# Patient Record
Sex: FEMALE | Race: WHITE | NOT HISPANIC OR LATINO | Employment: FULL TIME | URBAN - METROPOLITAN AREA
[De-identification: names, ages, dates, MRNs, and addresses within clinical notes are randomized per-mention and may not be internally consistent; named-entity substitution may affect disease eponyms.]

---

## 2017-01-09 ENCOUNTER — GENERIC CONVERSION - ENCOUNTER (OUTPATIENT)
Dept: OTHER | Facility: OTHER | Age: 42
End: 2017-01-09

## 2017-01-09 DIAGNOSIS — Z12.31 ENCOUNTER FOR SCREENING MAMMOGRAM FOR MALIGNANT NEOPLASM OF BREAST: ICD-10-CM

## 2017-05-23 ENCOUNTER — GENERIC CONVERSION - ENCOUNTER (OUTPATIENT)
Dept: OTHER | Facility: OTHER | Age: 42
End: 2017-05-23

## 2017-06-12 ENCOUNTER — ALLSCRIPTS OFFICE VISIT (OUTPATIENT)
Dept: OTHER | Facility: OTHER | Age: 42
End: 2017-06-12

## 2018-01-13 VITALS
DIASTOLIC BLOOD PRESSURE: 70 MMHG | BODY MASS INDEX: 37 KG/M2 | SYSTOLIC BLOOD PRESSURE: 120 MMHG | HEIGHT: 61 IN | WEIGHT: 196 LBS

## 2018-01-15 NOTE — MISCELLANEOUS
Message   Recorded as Task   Date: 01/09/2017 02:16 PM, Created By: Jose Heredia   Task Name: Miscellaneous   Assigned To: Mat Monroy   Regarding Patient: Kelsey Edmonds, Status: Active   CommentDeirshravan Whitneyas - 09 Jan 2017 2:16 PM     TASK CREATED  Caller: Self; Other; [phone] (Home)  Pt has mammo sched for 5/23 and needs order  Sakshi Chatterjee - 09 Jan 2017 2:25 PM     TASK EDITED  order in allscripts        Active Problems    1  Diabetes Mellitus (250 00)   2  Encounter for gynecological examination without abnormal finding (V72 31) (Z01 419)   3  Encounter for screening mammogram for malignant neoplasm of breast (V76 12)   (Z12 31)   4  Screening for HPV (human papillomavirus) (V73 81) (Z11 51)    Current Meds   1  Lantus 100 UNIT/ML Subcutaneous Solution; Therapy: (Blair Garcia) to Recorded   2  MetFORMIN HCl - 1000 MG Oral Tablet; Therapy: (Recorded:02May2016) to Recorded    Allergies    1  No Known Drug Allergies    Plan  Encounter for screening mammogram for malignant neoplasm of breast    · * MAMMO SCREENING BILATERAL W CAD; Status:Hold For - Scheduling,Retrospective  Authorization;  Requested for:23May2017;     Signatures   Electronically signed by : Gil Duron, ; Jan 9 2017  2:25PM EST                       (Author)

## 2018-05-17 DIAGNOSIS — Z12.31 ENCOUNTER FOR SCREENING MAMMOGRAM FOR MALIGNANT NEOPLASM OF BREAST: ICD-10-CM

## 2018-07-02 ENCOUNTER — ANNUAL EXAM (OUTPATIENT)
Dept: OBGYN CLINIC | Facility: MEDICAL CENTER | Age: 43
End: 2018-07-02
Payer: COMMERCIAL

## 2018-07-02 VITALS
HEIGHT: 61 IN | SYSTOLIC BLOOD PRESSURE: 116 MMHG | WEIGHT: 192 LBS | DIASTOLIC BLOOD PRESSURE: 78 MMHG | BODY MASS INDEX: 36.25 KG/M2

## 2018-07-02 DIAGNOSIS — Z12.31 VISIT FOR SCREENING MAMMOGRAM: Primary | ICD-10-CM

## 2018-07-02 PROCEDURE — 99396 PREV VISIT EST AGE 40-64: CPT | Performed by: PHYSICIAN ASSISTANT

## 2018-07-02 NOTE — PROGRESS NOTES
Marylee Callow  1975      CC:  Yearly exam    S:  36 y o  female here for yearly exam  Her cycles are regular, not heavy or crampy  She is sexually active  She uses tubal ligation for contraception  Last Pap 2016 neg/neg  Last Mammo 2018 neg      Current Outpatient Prescriptions:     BD ULTRA-FINE LANCETS lancets, USE TO INJECT INSULIN ONCE DAILY AS DIRECTED (FURTHER REFILLS TO BE GIVEN AT UPCOMING APPOINTMENT), Disp: , Rfl:     Insulin Glargine-Lixisenatide (SOLIQUA) 100 units-33 mcg/mL injection pen, Inject 50 Units under the skin, Disp: , Rfl:     metFORMIN (GLUCOPHAGE) 1000 MG tablet, TAKE 1 TABLET TWICE A DAY (1 TABLET IN THE MORNING AND 1 TABLET AT SUPPER), Disp: , Rfl:     Misc Natural Products (BLOOD SUGAR 360 PO), One touch verio strips Testing BID, Disp: , Rfl:   Social History     Social History    Marital status: /Civil Union     Spouse name: N/A    Number of children: N/A    Years of education: N/A     Occupational History    Not on file  Social History Main Topics    Smoking status: Never Smoker    Smokeless tobacco: Never Used    Alcohol use Yes      Comment: Social    Drug use: Unknown    Sexual activity: Yes     Other Topics Concern    Not on file     Social History Narrative    Coffee    Exercise frequently         Family History   Problem Relation Age of Onset    Arthritis Mother     Diabetes Maternal Grandmother     Heart disease Family     Hypertension Family       Past Medical History:   Diagnosis Date    Breast lump     last assessed: 09/25/13    Diabetes mellitus type II, controlled ([facility] [address] [facility]     Eustachian tube dysfunction     last assessed: 12/14/15    Vagina, candidiasis     last assessed: 05/23/13        O:  Blood pressure 116/78, height 5' 0 63" (1 54 m), weight 87 1 kg (192 lb)      Patient appears well and is not in distress  Neck is supple without masses  Breasts are symmetrical without mass, tenderness, nipple discharge, skin changes or adenopathy  Abdomen is soft and nontender without masses  External genitals are normal without lesions or rashes  Vagina is normal without discharge or bleeding  Cervix is normal without discharge or lesion  Uterus is normal, mobile, nontender without palpable mass  Adnexa are normal, nontender, without palpable mass  A:  Yearly exam      P:   Pap due 2021   Mammo slip provided    RTO one year for yearly exam or sooner as needed

## 2019-06-08 DIAGNOSIS — Z12.31 ENCOUNTER FOR SCREENING MAMMOGRAM FOR MALIGNANT NEOPLASM OF BREAST: ICD-10-CM

## 2019-07-22 ENCOUNTER — ANNUAL EXAM (OUTPATIENT)
Dept: OBGYN CLINIC | Facility: MEDICAL CENTER | Age: 44
End: 2019-07-22
Payer: COMMERCIAL

## 2019-07-22 VITALS — WEIGHT: 201.6 LBS | SYSTOLIC BLOOD PRESSURE: 116 MMHG | DIASTOLIC BLOOD PRESSURE: 62 MMHG | BODY MASS INDEX: 38.56 KG/M2

## 2019-07-22 DIAGNOSIS — Z12.31 ENCOUNTER FOR SCREENING MAMMOGRAM FOR MALIGNANT NEOPLASM OF BREAST: ICD-10-CM

## 2019-07-22 DIAGNOSIS — Z01.419 ENCNTR FOR GYN EXAM (GENERAL) (ROUTINE) W/O ABN FINDINGS: Primary | ICD-10-CM

## 2019-07-22 PROCEDURE — 99396 PREV VISIT EST AGE 40-64: CPT | Performed by: PHYSICIAN ASSISTANT

## 2019-07-22 NOTE — PROGRESS NOTES
Leodan Hearing  1975      CC:  Yearly exam    S:  39 y o  female here for yearly exam  Her cycles are regular, not heavy or crampy  She is sexually active without pain, bleeding or dryness  She uses tubal ligation for contraception  We reviewed [facility] guidelines for Pap testing today       Last Pap 5/2/16 neg/neg  Last Mammo 6/4/19 neg  Last Colonoscopy never    Current Outpatient Medications:     BD ULTRA-FINE LANCETS lancets, USE TO INJECT INSULIN ONCE DAILY AS DIRECTED (FURTHER REFILLS TO BE GIVEN AT UPCOMING APPOINTMENT), Disp: , Rfl:     Insulin Glargine-Lixisenatide (SOLIQUA) 100 units-33 mcg/mL injection pen, Inject 50 Units under the skin, Disp: , Rfl:     metFORMIN (GLUCOPHAGE) 1000 MG tablet, TAKE 1 TABLET TWICE A DAY (1 TABLET IN THE MORNING AND 1 TABLET AT SUPPER), Disp: , Rfl:     Misc Natural Products (BLOOD SUGAR 360 PO), One touch verio strips Testing BID, Disp: , Rfl:   Social History     Socioeconomic History    Marital status: /Civil Union     Spouse name: Not on file    Number of children: Not on file    Years of education: Not on file    Highest education level: Not on file   Occupational History    Not on file   Social Needs    Financial resource strain: Not on file    Food insecurity:     Worry: Not on file     Inability: Not on file    Transportation needs:     Medical: Not on file     Non-medical: Not on file   Tobacco Use    Smoking status: Never Smoker    Smokeless tobacco: Never Used   Substance and Sexual Activity    Alcohol use: Yes     Comment: Social    Drug use: Not on file    Sexual activity: Yes   Lifestyle    Physical activity:     Days per week: Not on file     Minutes per session: Not on file    Stress: Not on file   Relationships    Social connections:     Talks on phone: Not on file     Gets together: Not on file     Attends Zoroastrian service: Not on file     Active member of club or organization: Not on file     Attends meetings of clubs or organizations: Not on file     Relationship status: Not on file    Intimate partner violence:     Fear of current or ex partner: Not on file     Emotionally abused: Not on file     Physically abused: Not on file     Forced sexual activity: Not on file   Other Topics Concern    Not on file   Social History Narrative    Coffee    Exercise frequently     Family History   Problem Relation Age of Onset    Arthritis Mother     Diabetes Maternal Grandmother     Heart disease Family     Hypertension Family       Past Medical History:   Diagnosis Date    Breast lump     last assessed: 09/25/13    Diabetes mellitus type II, controlled ([facility] [address] [facility]     Eustachian tube dysfunction     last assessed: 12/14/15    Vagina, candidiasis     last assessed: 05/23/13        Review of Systems   Respiratory: Negative  Cardiovascular: Negative  Gastrointestinal: Negative for constipation and diarrhea  Genitourinary: Negative for difficulty urinating, pelvic pain, vaginal bleeding, vaginal discharge, itching or odor  O:  Blood pressure 116/62, weight 91 4 kg (201 lb 9 6 oz), last menstrual period 07/15/2019, not currently breastfeeding  Patient appears well and is not in distress  Neck is supple without masses  Breasts are symmetrical without mass, tenderness, nipple discharge, skin changes or adenopathy  Abdomen is soft and nontender without masses  External genitals are normal without lesions or rashes  Urethral meatus and urethra are normal  Bladder is normal to palpation  Vagina is normal without discharge or bleeding  Cervix is normal without discharge or lesion  Uterus is normal, mobile, nontender without palpable mass  Adnexa are normal, nontender, without palpable mass  A:  Yearly exam      P:   Pap due 6/2021   Mammo slip provided    RTO one year for yearly exam or sooner as needed

## 2020-06-11 DIAGNOSIS — Z12.31 ENCOUNTER FOR SCREENING MAMMOGRAM FOR MALIGNANT NEOPLASM OF BREAST: ICD-10-CM

## 2020-08-18 ENCOUNTER — ANNUAL EXAM (OUTPATIENT)
Dept: OBGYN CLINIC | Facility: CLINIC | Age: 45
End: 2020-08-18
Payer: COMMERCIAL

## 2020-08-18 VITALS
HEIGHT: 61 IN | SYSTOLIC BLOOD PRESSURE: 128 MMHG | TEMPERATURE: 98.8 F | BODY MASS INDEX: 35.5 KG/M2 | DIASTOLIC BLOOD PRESSURE: 80 MMHG | WEIGHT: 188 LBS

## 2020-08-18 DIAGNOSIS — Z01.419 ENCNTR FOR GYN EXAM (GENERAL) (ROUTINE) W/O ABN FINDINGS: Primary | ICD-10-CM

## 2020-08-18 DIAGNOSIS — Z12.31 ENCOUNTER FOR SCREENING MAMMOGRAM FOR MALIGNANT NEOPLASM OF BREAST: ICD-10-CM

## 2020-08-18 DIAGNOSIS — Z12.11 COLON CANCER SCREENING: ICD-10-CM

## 2020-08-18 PROCEDURE — 99396 PREV VISIT EST AGE 40-64: CPT | Performed by: PHYSICIAN ASSISTANT

## 2020-08-18 NOTE — PROGRESS NOTES
Brandin Estrella  1975      CC:  Yearly exam    S:  38 y o  female here for yearly exam  Her cycles are regular, not heavy or crampy  Sexual activity: She is sexually active without pain, bleeding or dryness  Contraception: She uses tubal ligation for contraception  Last Pap 5/2/16 neg/neg  Last Mammo 6/9/20 neg  Last Colonoscopy never    We reviewed [facility] guidelines for Pap testing today       Family hx of breast cancer: no  Family hx of ovarian cancer: no  Family hx of colon cancer: no      Current Outpatient Medications:     BD ULTRA-FINE LANCETS lancets, USE TO INJECT INSULIN ONCE DAILY AS DIRECTED (FURTHER REFILLS TO BE GIVEN AT UPCOMING APPOINTMENT), Disp: , Rfl:     Insulin Glargine-Lixisenatide (SOLIQUA) 100 units-33 mcg/mL injection pen, Inject 50 Units under the skin, Disp: , Rfl:     metFORMIN (GLUCOPHAGE) 1000 MG tablet, TAKE 1 TABLET TWICE A DAY (1 TABLET IN THE MORNING AND 1 TABLET AT SUPPER), Disp: , Rfl:     Misc Natural Products (BLOOD SUGAR 360 PO), One touch verio strips Testing BID, Disp: , Rfl:   Social History     Socioeconomic History    Marital status: /Civil Union     Spouse name: Not on file    Number of children: Not on file    Years of education: Not on file    Highest education level: Not on file   Occupational History    Not on file   Social Needs    Financial resource strain: Not on file    Food insecurity     Worry: Not on file     Inability: Not on file   [employer] needs     Medical: Not on file     Non-medical: Not on file   Tobacco Use    Smoking status: Never Smoker    Smokeless tobacco: Never Used   Substance and Sexual Activity    Alcohol use: Yes     Frequency: Never     Drinks per session: 1 or 2     Binge frequency: Never     Comment: Social    Drug use: Never    Sexual activity: Yes     Partners: Male     Birth control/protection: Female Sterilization   Lifestyle    Physical activity     Days per week: Not on file     Minutes per session: Not on file    Stress: Not on file   Relationships    Social connections     Talks on phone: Not on file     Gets together: Not on file     Attends Worship service: Not on file     Active member of club or organization: Not on file     Attends meetings of clubs or organizations: Not on file     Relationship status: Not on file    Intimate partner violence     Fear of current or ex partner: Not on file     Emotionally abused: Not on file     Physically abused: Not on file     Forced sexual activity: Not on file   Other Topics Concern    Not on file   Social History Narrative    Coffee    Exercise frequently     Family History   Problem Relation Age of Onset    Arthritis Mother     Diabetes Maternal Grandmother     Heart disease Family     Hypertension Family       Past Medical History:   Diagnosis Date    Breast lump     last assessed: 09/25/13    [facility] mellitus ([facility] [address] [facility]     Diabetes mellitus type II, controlled ([facility] [address] [facility]     Eustachian tube dysfunction     last assessed: 12/14/15    Hx of tubal ligation     Vagina, candidiasis     last assessed: 05/23/13        Review of Systems   Respiratory: Negative  Cardiovascular: Negative  Gastrointestinal: Negative for constipation and diarrhea  Genitourinary: Negative for difficulty urinating, pelvic pain, vaginal bleeding, vaginal discharge, itching or odor  O:  Blood pressure 128/80, temperature 98 8 °F (37 1 °C), temperature source Tympanic, height 5' 1 02" (1 55 m), weight 85 3 kg (188 lb), last menstrual period 07/25/2020, not currently breastfeeding  Patient appears well and is not in distress  Neck is supple without masses  Breasts are symmetrical without mass, tenderness, nipple discharge, skin changes or adenopathy  Abdomen is soft and nontender without masses  External genitals are normal without lesions or rashes    Urethral meatus and urethra are normal  Bladder is normal to palpation  Vagina is normal without discharge or bleeding  Cervix is normal without discharge or lesion  Uterus is normal, mobile, nontender without palpable mass  Adnexa are normal, nontender, without palpable mass  A:  Yearly exam      P:   Pap and HPV today    Mammo slip provided   Colonoscopy referral entered     RTO one year for yearly exam or sooner as needed

## 2020-08-20 LAB
CYTOLOGIST CVX/VAG CYTO: NORMAL
DX ICD CODE: NORMAL
HPV I/H RISK 1 DNA CVX QL PROBE+SIG AMP: NEGATIVE
OTHER STN SPEC: NORMAL
PATH REPORT.FINAL DX SPEC: NORMAL
SL AMB NOTE:: NORMAL
SL AMB SPECIMEN ADEQUACY: NORMAL
SL AMB TEST METHODOLOGY: NORMAL

## 2020-09-14 ENCOUNTER — APPOINTMENT (OUTPATIENT)
Dept: RADIOLOGY | Facility: MEDICAL CENTER | Age: 45
End: 2020-09-14
Payer: COMMERCIAL

## 2020-09-14 ENCOUNTER — OFFICE VISIT (OUTPATIENT)
Dept: URGENT CARE | Facility: MEDICAL CENTER | Age: 45
End: 2020-09-14
Payer: COMMERCIAL

## 2020-09-14 VITALS
RESPIRATION RATE: 18 BRPM | DIASTOLIC BLOOD PRESSURE: 61 MMHG | SYSTOLIC BLOOD PRESSURE: 131 MMHG | BODY MASS INDEX: 34.36 KG/M2 | WEIGHT: 182 LBS | HEIGHT: 61 IN | HEART RATE: 80 BPM | TEMPERATURE: 98.9 F | OXYGEN SATURATION: 100 %

## 2020-09-14 DIAGNOSIS — M79.644 THUMB PAIN, RIGHT: ICD-10-CM

## 2020-09-14 DIAGNOSIS — M79.644 THUMB PAIN, RIGHT: Primary | ICD-10-CM

## 2020-09-14 PROCEDURE — 99213 OFFICE O/P EST LOW 20 MIN: CPT | Performed by: PHYSICIAN ASSISTANT

## 2020-09-14 PROCEDURE — 73140 X-RAY EXAM OF FINGER(S): CPT

## 2020-09-14 NOTE — PATIENT INSTRUCTIONS
Thumb pain  Referred to orthopedics  Follow up with PCP in 3-5 days  Proceed to  ER if symptoms worsen  Finger Sprain   WHAT YOU NEED TO KNOW:   A finger sprain happens when ligaments in your finger or thumb are stretched or torn  Ligaments are the tough tissues that connect bones  Ligaments allow your hands to grasp and pinch  DISCHARGE INSTRUCTIONS:   Return to the emergency department if:   · The skin on your injured finger looks bluish or pale (less color than normal)  · You have new weakness or numbness in your finger or thumb  It may tingle or burn  · You have a splint that you cannot adjust and it feels too tight  Contact your healthcare provider if:   · You have new or increased swelling or pain in your finger  · You have new or increased stiffness when you move your injured finger  · You have questions or concerns about your injury or treatment  Medicines:   · Pain medicine  may be given  Do not wait until the pain is severe before taking your medicine  · Take your medicine as directed  Call your healthcare provider if you think your medicines are not helping or if you have side effects  Tell him if you take vitamins, herbs, or any other medicines  Keep a written list of your medicines  Include the amounts, and when and why you take them  Bring the list or the pill bottles to follow-up visits  Care for your finger:   · Rest  your finger for at least 48 hours  Do not do activities that cause pain  Return to normal activities as directed  · Apply ice  on your finger to help decrease pain and swelling  Put crushed ice in a plastic bag and cover it with a towel  Put the ice on your injured finger or thumb every hour for 15 to 20 minutes at a time  You may need to ice the area at least 4 to 8 times each day  Ice your finger for as many days as directed  · Elevate your finger  above the level of your heart as often as you can  This will help decrease swelling and pain   You can elevate your hand by resting it on a pillow  · Use a splint or compression as directed  Compression (tight hold) helps support your finger or thumb as it heals  Tape your injured finger to the finger beside it  Severe sprains may be treated with a splint  A splint prevents your finger from moving while it heals  Ask how long you must wear the splint or tape, and how to apply them  · Do exercises as directed  You may be given gentle exercises to begin in a few days  Exercises can help decrease stiffness in your finger or thumb  Exercises also help decrease pain and swelling and improve the movement of your finger or thumb  Check with your healthcare provider before you return to your normal activities or sports  Follow up with your healthcare provider as directed:  Write down any questions you may have to ask at your follow up visits  © 2017 [address] [employer] Information is for End User's use only and may not be sold, redistributed or otherwise used for commercial purposes  All illustrations and images included in CareNotes® are the copyrighted property of A [employer] M [employer]  or Brandon Montemayor  The above information is an  only  It is not intended as medical advice for individual conditions or treatments  Talk to your doctor, nurse or pharmacist before following any medical regimen to see if it is safe and effective for you

## 2020-09-14 NOTE — PROGRESS NOTES
[facility]        NAME: Jyothi Castellanos is a 38 y o  female  : 1975    MRN: [id number]  DATE: 2020  TIME: 6:30 PM    Assessment and Plan   Thumb pain, right [M79 644]  1  Thumb pain, right  XR thumb right first digit-min 2v         Patient Instructions     Thumb pain  Referred to orthopedics  Follow up with PCP in 3-5 days  Proceed to  ER if symptoms worsen  Chief Complaint     Chief Complaint   Patient presents with    Hand Pain     Right thumb pain x 1 week  History of Present Illness       37 y/o female presents c/o pain and feeling like thumb is clicking when she tries to bend it  Patient had trauma to thumb 1 year ago but these symptoms started only 1 week ago  Review of Systems   Review of Systems   Constitutional: Negative  HENT: Negative  Eyes: Negative  Respiratory: Negative  Negative for cough, chest tightness, shortness of breath, wheezing and stridor  Cardiovascular: Negative  Negative for chest pain, palpitations and leg swelling  Musculoskeletal: Positive for arthralgias           Current Medications       Current Outpatient Medications:     BD ULTRA-FINE LANCETS lancets, USE TO INJECT INSULIN ONCE DAILY AS DIRECTED (FURTHER REFILLS TO BE GIVEN AT UPCOMING APPOINTMENT), Disp: , Rfl:     Insulin Glargine-Lixisenatide (SOLIQUA) 100 units-33 mcg/mL injection pen, Inject 50 Units under the skin, Disp: , Rfl:     metFORMIN (GLUCOPHAGE) 1000 MG tablet, TAKE 1 TABLET TWICE A DAY (1 TABLET IN THE MORNING AND 1 TABLET AT SUPPER), Disp: , Rfl:     Misc Natural Products (BLOOD SUGAR 360 PO), One touch verio strips Testing BID, Disp: , Rfl:     Current Allergies     Allergies as of 2020    (No Known Allergies)            The following portions of the patient's history were reviewed and updated as appropriate: allergies, current medications, past family history, past medical history, past social history, past surgical history and problem list      Past Medical History:   Diagnosis Date    Breast lump     last assessed: 13    Diabetes mellitus ([facility] [address]     Diabetes mellitus type II, controlled ([facility] )     Eustachian tube dysfunction     last assessed: 12/14/15    Hx of tubal ligation     Vagina, candidiasis     last assessed: 13       Past Surgical History:   Procedure Laterality Date     SECTION      x3       Family History   Problem Relation Age of Onset    Arthritis Mother     Diabetes Maternal Grandmother     Heart disease Family     Hypertension Family     No Known Problems Father          Medications have been verified  Objective   /61   Pulse 80   Temp 98 9 °F (37 2 °C) (Temporal)   Resp 18   Ht 5' 1" (1 549 m)   Wt 82 6 kg (182 lb)   LMP 2020 (Approximate)   SpO2 100%   BMI 34 39 kg/m²        Physical Exam     Physical Exam  Constitutional:       Appearance: She is well-developed  HENT:      Head: Normocephalic and atraumatic  Right Ear: External ear normal       Left Ear: External ear normal       Nose: Nose normal       Mouth/Throat:      Pharynx: No oropharyngeal exudate  Neck:      Musculoskeletal: Normal range of motion and neck supple  Cardiovascular:      Rate and Rhythm: Normal rate and regular rhythm  Heart sounds: Normal heart sounds  Pulmonary:      Effort: Pulmonary effort is normal  No respiratory distress  Breath sounds: Normal breath sounds  No wheezing or rales  Chest:      Chest wall: No tenderness  Musculoskeletal:        Hands:    Lymphadenopathy:      Cervical: No cervical adenopathy

## 2020-09-21 ENCOUNTER — OFFICE VISIT (OUTPATIENT)
Dept: OBGYN CLINIC | Facility: MEDICAL CENTER | Age: 45
End: 2020-09-21
Payer: COMMERCIAL

## 2020-09-21 VITALS
HEIGHT: 61 IN | HEART RATE: 76 BPM | SYSTOLIC BLOOD PRESSURE: 149 MMHG | DIASTOLIC BLOOD PRESSURE: 85 MMHG | WEIGHT: 185.8 LBS | BODY MASS INDEX: 35.08 KG/M2

## 2020-09-21 DIAGNOSIS — M65.311 TRIGGER THUMB OF RIGHT HAND: Primary | ICD-10-CM

## 2020-09-21 DIAGNOSIS — M79.644 THUMB PAIN, RIGHT: ICD-10-CM

## 2020-09-21 PROCEDURE — 99203 OFFICE O/P NEW LOW 30 MIN: CPT | Performed by: ORTHOPAEDIC SURGERY

## 2020-09-21 PROCEDURE — 20550 NJX 1 TENDON SHEATH/LIGAMENT: CPT | Performed by: ORTHOPAEDIC SURGERY

## 2020-09-21 RX ORDER — TRIAMCINOLONE ACETONIDE 40 MG/ML
20 INJECTION, SUSPENSION INTRA-ARTICULAR; INTRAMUSCULAR
Status: COMPLETED | OUTPATIENT
Start: 2020-09-21 | End: 2020-09-21

## 2020-09-21 RX ORDER — LIDOCAINE HYDROCHLORIDE 10 MG/ML
0.5 INJECTION, SOLUTION INFILTRATION; PERINEURAL
Status: COMPLETED | OUTPATIENT
Start: 2020-09-21 | End: 2020-09-21

## 2020-09-21 RX ADMIN — LIDOCAINE HYDROCHLORIDE 0.5 ML: 10 INJECTION, SOLUTION INFILTRATION; PERINEURAL at 13:12

## 2020-09-21 RX ADMIN — TRIAMCINOLONE ACETONIDE 20 MG: 40 INJECTION, SUSPENSION INTRA-ARTICULAR; INTRAMUSCULAR at 13:12

## 2020-09-21 NOTE — PROGRESS NOTES
CHIEF COMPLAINT:  Chief Complaint   Patient presents with    Right Thumb - Pain       SUBJECTIVE:  Rosa Goodman is a 38y o  year old  female who presents to the office for evaluation of her right thumb  Patient was seen in urgent care 2020 after 1 week of pain and catching with flexion of her right thumb        PAST MEDICAL HISTORY:  Past Medical History:   Diagnosis Date    Breast lump     last assessed: 13    Diabetes mellitus ([facility] [address] )     Diabetes mellitus type II, controlled ([facility] )     Eustachian tube dysfunction     last assessed: 12/14/15    Hx of tubal ligation     Vagina, candidiasis     last assessed: 13       PAST SURGICAL HISTORY:  Past Surgical History:   Procedure Laterality Date     SECTION      x3       FAMILY HISTORY:  Family History   Problem Relation Age of Onset    Arthritis Mother     Diabetes Maternal Grandmother     Heart disease Family     Hypertension Family     No Known Problems Father        SOCIAL HISTORY:  Social History     Tobacco Use    Smoking status: Never Smoker    Smokeless tobacco: Never Used   Substance Use Topics    Alcohol use: Yes     Frequency: Never     Drinks per session: 1 or 2     Binge frequency: Never     Comment: Social    Drug use: Never       MEDICATIONS:    Current Outpatient Medications:     BD ULTRA-FINE LANCETS lancets, USE TO INJECT INSULIN ONCE DAILY AS DIRECTED (FURTHER REFILLS TO BE GIVEN AT UPCOMING APPOINTMENT), Disp: , Rfl:     Insulin Glargine-Lixisenatide (SOLIQUA) 100 units-33 mcg/mL injection pen, Inject 50 Units under the skin, Disp: , Rfl:     metFORMIN (GLUCOPHAGE) 1000 MG tablet, TAKE 1 TABLET TWICE A DAY (1 TABLET IN THE MORNING AND 1 TABLET AT SUPPER), Disp: , Rfl:     Misc Natural Products (BLOOD SUGAR 360 PO), One touch verio strips Testing BID, Disp: , Rfl:     ALLERGIES:  No Known Allergies    REVIEW OF SYSTEMS:  Review of Systems   Constitutional: Negative for chills, fever and unexpected weight change  HENT: Negative for hearing loss, nosebleeds and sore throat  Eyes: Negative for pain, redness and visual disturbance  Respiratory: Negative for cough, shortness of breath and wheezing  Cardiovascular: Negative for chest pain, palpitations and leg swelling  Gastrointestinal: Negative for abdominal pain, nausea and vomiting  Endocrine: Negative for polydipsia and polyuria  Genitourinary: Negative for dysuria and hematuria  Skin: Negative for rash and wound  Neurological: Negative for dizziness and headaches  Psychiatric/Behavioral: Negative for decreased concentration, dysphoric mood and suicidal ideas  The patient is not nervous/anxious  VITALS:  Vitals:    09/21/20 1259   BP: 149/85   Pulse: 76       LABS:  HgA1c:   Lab Results   Component Value Date    HGBA1C 6 3 (H) 03/29/2014     BMP:   Lab Results   Component Value Date    GLUCOSE 78 03/29/2014    CALCIUM 9 4 03/29/2014     03/29/2014    K 3 9 03/29/2014    CO2 25 03/29/2014     03/29/2014    BUN 15 03/29/2014    CREATININE 0 55 (L) 03/29/2014       _____________________________________________________  PHYSICAL EXAMINATION:  General: well developed and well nourished, alert, oriented times 3 and appears comfortable  Psychiatric: Normal  HEENT: Trachea Midline, No torticollis  Pulmonary: No audible wheezing or strider  Cardiovascular: No discernable arrhythmia   Skin: No masses, erythema, lacerations, fluctation, ulcerations  Neurovascular: Sensation Intact to the Median, Ulnar, Radial Nerve, Motor Intact to the Median, Ulnar, Radial Nerve and Pulses Intact    MUSCULOSKELETAL EXAMINATION:  Right thumb  Positive palpable nodule over the A1 pulley  Positive tenderness to palpation over A1 pulley  Positive clicking  Positive catching      ___________________________________________________  STUDIES REVIEWED:  No studies reviewed         PROCEDURES PERFORMED:  Hand/upper extremity injection: R thumb A1  Date/Time: 9/21/2020 1:12 PM  Consent given by: patient  Site marked: site marked  Timeout: Immediately prior to procedure a time out was called to verify the correct patient, procedure, equipment, support staff and site/side marked as required   Supporting Documentation  Indications: tendon swelling and pain   Procedure Details  Condition:trigger finger Location: thumb - R thumb A1   Preparation: Patient was prepped and draped in the usual sterile fashion  Ultrasound guidance: no  Medications administered: 0 5 mL lidocaine 1 %; 20 mg triamcinolone acetonide 40 mg/mL    Patient tolerance: patient tolerated the procedure well with no immediate complications  Dressing:  Sterile dressing applied              _____________________________________________________  ASSESSMENT/PLAN:    Right Trigger thumb   *CSI was administered into the right thumb A1 pully without complications  *Pt was advised to apply heat for any residual discomfort or clicking and locking  *Pt was advised that only 2 CSI could be administered for this condition, and after we would have to consider trigger release if they are still having pain, clicking, locking  *Pt will follow up in the office in 2 weeks      Follow Up:  Return in about 2 weeks (around 10/5/2020)  To Do Next Visit:  Re-evaluation of current issue    General Discussions:  Trigger Finger: The anatomy and physiology of trigger finger was discussed with the patient today in the office  Edema and increased contact pressure within the flexor tendons at the A1 pulley can cause pain, crepitation, and triggering or locking of the digit resulting in limitation of function  Symptoms can occur at anytime but are typically worse in the morning or after a brief rest from repetitive activity    Treatment options include resting/nighttime MP blocking splints to decrease edema, oral anti-inflammatory medications, home or formal therapy exercises, up to 2 steroid injections within the tendon sheath, or surgical release  While majority of patients do respond to conservative treatment, up to 20% may require surgical release       Scribe Attestation    I,:   Roney Hazel am acting as a scribe while in the presence of the attending physician :        I,:   Kendra Stone MD personally performed the services described in this documentation    as scribed in my presence :

## 2020-09-30 ENCOUNTER — OFFICE VISIT (OUTPATIENT)
Dept: GASTROENTEROLOGY | Facility: CLINIC | Age: 45
End: 2020-09-30
Payer: COMMERCIAL

## 2020-09-30 VITALS
HEART RATE: 102 BPM | DIASTOLIC BLOOD PRESSURE: 80 MMHG | SYSTOLIC BLOOD PRESSURE: 130 MMHG | TEMPERATURE: 99.1 F | BODY MASS INDEX: 35.12 KG/M2 | WEIGHT: 186 LBS | HEIGHT: 61 IN

## 2020-09-30 DIAGNOSIS — Z12.11 COLON CANCER SCREENING: ICD-10-CM

## 2020-09-30 DIAGNOSIS — Z80.0 FAMILY HISTORY OF COLON CANCER IN MOTHER: Primary | ICD-10-CM

## 2020-09-30 PROCEDURE — 99244 OFF/OP CNSLTJ NEW/EST MOD 40: CPT | Performed by: INTERNAL MEDICINE

## 2020-09-30 RX ORDER — SODIUM, POTASSIUM,MAG SULFATES 17.5-3.13G
177 SOLUTION, RECONSTITUTED, ORAL ORAL ONCE
Qty: 2 BOTTLE | Refills: 0 | Status: SHIPPED | OUTPATIENT
Start: 2020-09-30 | End: 2020-11-19 | Stop reason: ALTCHOICE

## 2020-10-05 ENCOUNTER — OFFICE VISIT (OUTPATIENT)
Dept: OBGYN CLINIC | Facility: MEDICAL CENTER | Age: 45
End: 2020-10-05
Payer: COMMERCIAL

## 2020-10-05 VITALS
BODY MASS INDEX: 34.74 KG/M2 | WEIGHT: 184 LBS | HEART RATE: 76 BPM | SYSTOLIC BLOOD PRESSURE: 145 MMHG | DIASTOLIC BLOOD PRESSURE: 83 MMHG | HEIGHT: 61 IN

## 2020-10-05 DIAGNOSIS — M65.311 TRIGGER THUMB OF RIGHT HAND: Primary | ICD-10-CM

## 2020-10-05 PROCEDURE — 99213 OFFICE O/P EST LOW 20 MIN: CPT | Performed by: ORTHOPAEDIC SURGERY

## 2020-11-04 ENCOUNTER — ANESTHESIA EVENT (OUTPATIENT)
Dept: GASTROENTEROLOGY | Facility: AMBULARY SURGERY CENTER | Age: 45
End: 2020-11-04

## 2020-11-11 ENCOUNTER — PREP FOR PROCEDURE (OUTPATIENT)
Dept: GASTROENTEROLOGY | Facility: CLINIC | Age: 45
End: 2020-11-11

## 2020-11-11 DIAGNOSIS — Z20.822 ENCOUNTER FOR LABORATORY TESTING FOR COVID-19 VIRUS: Primary | ICD-10-CM

## 2020-11-18 RX ORDER — LIDOCAINE HYDROCHLORIDE 10 MG/ML
0.5 INJECTION, SOLUTION EPIDURAL; INFILTRATION; INTRACAUDAL; PERINEURAL ONCE AS NEEDED
Status: CANCELLED | OUTPATIENT
Start: 2020-11-18

## 2020-11-18 RX ORDER — SODIUM CHLORIDE, SODIUM LACTATE, POTASSIUM CHLORIDE, CALCIUM CHLORIDE 600; 310; 30; 20 MG/100ML; MG/100ML; MG/100ML; MG/100ML
125 INJECTION, SOLUTION INTRAVENOUS CONTINUOUS
Status: CANCELLED | OUTPATIENT
Start: 2020-11-18

## 2020-11-19 ENCOUNTER — ANESTHESIA (OUTPATIENT)
Dept: GASTROENTEROLOGY | Facility: AMBULARY SURGERY CENTER | Age: 45
End: 2020-11-19

## 2020-11-19 ENCOUNTER — HOSPITAL ENCOUNTER (OUTPATIENT)
Dept: GASTROENTEROLOGY | Facility: AMBULARY SURGERY CENTER | Age: 45
Setting detail: OUTPATIENT SURGERY
Discharge: HOME | End: 2020-11-19
Attending: INTERNAL MEDICINE | Admitting: INTERNAL MEDICINE
Payer: COMMERCIAL

## 2020-11-19 VITALS
RESPIRATION RATE: 15 BRPM | WEIGHT: 180 LBS | BODY MASS INDEX: 33.99 KG/M2 | DIASTOLIC BLOOD PRESSURE: 69 MMHG | HEART RATE: 66 BPM | TEMPERATURE: 96.9 F | OXYGEN SATURATION: 99 % | SYSTOLIC BLOOD PRESSURE: 133 MMHG | HEIGHT: 61 IN

## 2020-11-19 VITALS — HEART RATE: 72 BPM

## 2020-11-19 DIAGNOSIS — Z12.11 COLON CANCER SCREENING: ICD-10-CM

## 2020-11-19 LAB — GLUCOSE SERPL-MCNC: 94 MG/DL (ref 65–140)

## 2020-11-19 PROCEDURE — 88305 TISSUE EXAM BY PATHOLOGIST: CPT | Performed by: PATHOLOGY

## 2020-11-19 PROCEDURE — 45385 COLONOSCOPY W/LESION REMOVAL: CPT | Performed by: INTERNAL MEDICINE

## 2020-11-19 PROCEDURE — 82948 REAGENT STRIP/BLOOD GLUCOSE: CPT

## 2020-11-19 RX ORDER — SODIUM CHLORIDE, SODIUM LACTATE, POTASSIUM CHLORIDE, CALCIUM CHLORIDE 600; 310; 30; 20 MG/100ML; MG/100ML; MG/100ML; MG/100ML
INJECTION, SOLUTION INTRAVENOUS CONTINUOUS PRN
Status: DISCONTINUED | OUTPATIENT
Start: 2020-11-19 | End: 2020-11-19

## 2020-11-19 RX ORDER — PROPOFOL 10 MG/ML
INJECTION, EMULSION INTRAVENOUS AS NEEDED
Status: DISCONTINUED | OUTPATIENT
Start: 2020-11-19 | End: 2020-11-19

## 2020-11-19 RX ORDER — LIDOCAINE HYDROCHLORIDE 20 MG/ML
INJECTION, SOLUTION EPIDURAL; INFILTRATION; INTRACAUDAL; PERINEURAL AS NEEDED
Status: DISCONTINUED | OUTPATIENT
Start: 2020-11-19 | End: 2020-11-19

## 2020-11-19 RX ADMIN — PROPOFOL 50 MG: 10 INJECTION, EMULSION INTRAVENOUS at 07:46

## 2020-11-19 RX ADMIN — SODIUM CHLORIDE, SODIUM LACTATE, POTASSIUM CHLORIDE, AND CALCIUM CHLORIDE: .6; .31; .03; .02 INJECTION, SOLUTION INTRAVENOUS at 07:43

## 2020-11-19 RX ADMIN — PROPOFOL 50 MG: 10 INJECTION, EMULSION INTRAVENOUS at 07:59

## 2020-11-19 RX ADMIN — PROPOFOL 50 MG: 10 INJECTION, EMULSION INTRAVENOUS at 07:53

## 2020-11-19 RX ADMIN — LIDOCAINE HYDROCHLORIDE 50 MG: 20 INJECTION, SOLUTION EPIDURAL; INFILTRATION; INTRACAUDAL; PERINEURAL at 07:46

## 2020-11-19 RX ADMIN — Medication 40 MG: at 07:51

## 2020-11-19 RX ADMIN — PROPOFOL 50 MG: 10 INJECTION, EMULSION INTRAVENOUS at 07:47

## 2021-02-11 LAB
LEFT EYE DIABETIC RETINOPATHY: NORMAL
RIGHT EYE DIABETIC RETINOPATHY: NORMAL
SEVERITY (EYE EXAM): NORMAL

## 2021-03-30 DIAGNOSIS — Z23 ENCOUNTER FOR IMMUNIZATION: ICD-10-CM

## 2021-04-02 ENCOUNTER — IMMUNIZATIONS (OUTPATIENT)
Dept: FAMILY MEDICINE CLINIC | Facility: HOSPITAL | Age: 46
End: 2021-04-02

## 2021-04-02 DIAGNOSIS — Z23 ENCOUNTER FOR IMMUNIZATION: Primary | ICD-10-CM

## 2021-04-02 PROCEDURE — 91300 SARS-COV-2 / COVID-19 MRNA VACCINE (PFIZER-BIONTECH) 30 MCG: CPT

## 2021-04-02 PROCEDURE — 0001A SARS-COV-2 / COVID-19 MRNA VACCINE (PFIZER-BIONTECH) 30 MCG: CPT

## 2021-04-13 ENCOUNTER — OFFICE VISIT (OUTPATIENT)
Dept: DERMATOLOGY | Facility: CLINIC | Age: 46
End: 2021-04-13
Payer: COMMERCIAL

## 2021-04-13 VITALS — WEIGHT: 186 LBS | HEIGHT: 61 IN | TEMPERATURE: 98.1 F | BODY MASS INDEX: 35.12 KG/M2

## 2021-04-13 DIAGNOSIS — D48.5 NEOPLASM OF UNCERTAIN BEHAVIOR OF SKIN: ICD-10-CM

## 2021-04-13 DIAGNOSIS — L23.7 POISON IVY DERMATITIS: Primary | ICD-10-CM

## 2021-04-13 DIAGNOSIS — L91.8 ACROCHORDON: ICD-10-CM

## 2021-04-13 DIAGNOSIS — L98.491 SUPERFICIAL ULCER OF SKIN (HCC): ICD-10-CM

## 2021-04-13 PROCEDURE — 11104 PUNCH BX SKIN SINGLE LESION: CPT | Performed by: DERMATOLOGY

## 2021-04-13 PROCEDURE — 99204 OFFICE O/P NEW MOD 45 MIN: CPT | Performed by: DERMATOLOGY

## 2021-04-13 NOTE — PATIENT INSTRUCTIONS
1  IRRITATED SUPERFICIAL ULCER  Assessment and Plan:  Based on a thorough discussion of this condition and the management approach to it (including a comprehensive discussion of the known risks, side effects and potential benefits of treatment), the patient (family) agrees to implement the following specific plan:   Recommend keep covered and apply Vaseline  2  ACROCHORDON ("SKIN TAG")    Assessment and Plan:  Based on a thorough discussion of this condition and the management approach to it (including a comprehensive discussion of the known risks, side effects and potential benefits of treatment), the patient (family) agrees to implement the following specific plan:   No treatment necessary     Skin tags are common, soft, harmless skin lesions that are also called, in the appropriate settings, papillomas, fibroepithelial polyps, and soft fibromas  They are made up of loosely arranged collagen fibers and blood vessels surrounded by a thickened or thinned-out epidermis  Skin tags tend to develop in both men and women as we grow older  They are usually found on the skin folds (neck, armpits, groin)  It is not known what specifically causes skin tags  Certain factors, though, do appear to play a role:   Chaffing and irritation from skin rubbing together   High levels of growth factors (as seen, for example, in pregnancy or in acromegaly/gigantism)   Insulin resistance   Human papillomavirus (wart virus)    We discussed that most skin tags do not need to be treated unless they are specifically causing the patient physical distress or limitation or pose a risk for a larger problem such as an infection that forms secondary to excoriation or chronic irritation      We had a thorough discussion of treatment options and specific risks (including that any procedural treatment may not be covered by insurance and would then be the patient's responsibility) and benefits/alternatives including but not limited to the following:   Cryotherapy (freezing)   Shave removal   Surgical excision (snip excision with scissors)   Electrosurgery   Ligation (we do not do this procedure and counseled against it due to risk of tissue necrosis and infection)        3  NEOPLASM OF UNCERTAIN BEHAVIOR OF SKIN    Assessment and Plan:   I have discussed with the patient that a sample of skin via a "skin biopsy would be potentially helpful to further make a specific diagnosis under the microscope   Based on a thorough discussion of this condition and the management approach to it (including a comprehensive discussion of the known risks, side effects and potential benefits of treatment), the patient (family) agrees to implement the following specific plan:    o Procedure:  Skin Biopsy  After a thorough discussion of treatment options and risk/benefits/alternatives (including but not limited to local pain, scarring, dyspigmentation, blistering, possible superinfection, and inability to confirm a diagnosis via histopathology), verbal and written consent were obtained and portion of the rash was biopsied for tissue sample  See below for consent that was obtained from patient and subsequent Procedure Note   o       Punch Biopsy preformed; will follow up with results  Plan:  1  Instructed to keep the wound dry and covered for 24-48h and clean thereafter  2  Warning signs of infection were reviewed  3  Recommended that the patient use acetaminophen as needed for pain  4  Sutures if any should be removed in 7 days       4  POISON IVY DERMATITIS     Assessment and Plan:  Based on a thorough discussion of this condition and the management approach to it (including a comprehensive discussion of the known risks, side effects and potential benefits of treatment), the patient (family) agrees to implement the following specific plan:   Triamcinolone 0 1% ointment prescribed; Apply twice a day until rash clears   Follow up as needed

## 2021-04-13 NOTE — PROGRESS NOTES
[facility] Note     Patient Name: Azam Justin  Encounter Date: 04/13/2021     Have you been cared for by a [facility] Dermatologist in the last 3 years and, if so, which one? No    · Have you traveled outside of the [address] [place] [address] in the past 3 months or outside of the [facility] area in the last 2 weeks? No     May we call your Preferred Phone number to discuss your specific medical information? Yes     May we leave a detailed message that includes your specific medical information? Yes      Today's Chief Concerns:   Concern #1:  Skin tags   Concern #2:  Rash     Past Medical History:  Have you personally ever had or currently have any of the following? · Skin cancer (such as Melanoma, Basal Cell Carcinoma, Squamous Cell Carcinoma? (If Yes, please provide more detail)- No  · Eczema: YES  · Psoriasis: No  · HIV/AIDS: No  · Hepatitis B or C: No  · Tuberculosis: No  · Systemic Immunosuppression such as Diabetes, Biologic or Immunotherapy, Chemotherapy, Organ Transplantation, Bone Marrow Transplantation (If YES, please provide more detail): YES, Diabetes type 2  · Radiation Treatment (If YES, please provide more detail): No  · Any other major medical conditions/concerns? (If Yes, which types)- No    Social History:     What is/was your primary occupation?       What are your hobbies/past-times? N/A    Family History:  Have any of your "first degree relatives" (parent, brother, sister, or child) had any of the following       · Skin cancer such as Melanoma or Merkel Cell Carcinoma or Pancreatic Cancer? No  · Eczema, Asthma, Hay Fever or Seasonal Allergies: YES, Mother- seasonal allergies   · Psoriasis or Psoriatic Arthritis: No  · Do any other medical conditions seem to run in your family? If Yes, what condition and which relatives?   No    Current Medications:       Current Outpatient Medications:     BD ULTRA-FINE LANCETS lancets, USE TO INJECT INSULIN ONCE DAILY AS DIRECTED (FURTHER REFILLS TO BE GIVEN AT UPCOMING APPOINTMENT), Disp: , Rfl:     Insulin Glargine-Lixisenatide (SOLIQUA) 100 units-33 mcg/mL injection pen, Inject 50 Units under the skin, Disp: , Rfl:     metFORMIN (GLUCOPHAGE) 1000 MG tablet, TAKE 1 TABLET TWICE A DAY (1 TABLET IN THE MORNING AND 1 TABLET AT SUPPER), Disp: , Rfl:     Misc Natural Products (BLOOD SUGAR 360 PO), One touch verio strips Testing BID, Disp: , Rfl:       Review of Systems:  Have you recently had or currently have any of the following? If YES, what are you doing for the problem? · Fever, chills or unintended weight loss: No  · Sudden loss or change in your vision: No  · Nausea, vomiting or blood in your stool: No  · Painful or swollen joints: No  · Wheezing or cough: No  · Changing mole or non-healing wound: No  · Nosebleeds: No  · Excessive sweating: No  · Easy or prolonged bleeding? No  · Over the last 2 weeks, how often have you been bothered by the following problems? · Taking little interest or pleasure in doing things: 1 - Not at All  · Feeling down, depressed, or hopeless: 1 - Not at All  · Rapid heartbeat with epinephrine:  No    · FEMALES ONLY:    · Are you pregnant or planning to become pregnant? No  · Are you currently or planning to be nursing or breast feeding? No    · Any known allergies? No Known Allergies      Physical Exam:     Was a chaperone (Derm Clinical Assistant) present throughout the entire Physical Exam? Yes     Did the Dermatology Team specifically  the patient on the importance of a Full Skin Exam to be sure that nothing is missed clinically?  Yes}  o Did the patient ultimately request or accept a Full Skin Exam?  NO  o Did the patient specifically refuse to have the areas "under-the-bra" examined by the Dermatologist? Eze driver Did the patient specifically refuse to have the areas "under-the-underwear" examined by the Dermatologist? YES    CONSTITUTIONAL:   Vitals: 04/13/21 0747   Temp: 98 1 °F (36 7 °C)   Weight: 84 4 kg (186 lb)   Height: 5' 1" (1 549 m)       PSYCH: Normal mood and affect  EYES: Normal conjunctiva  ENT: Normal lips and oral mucosa  CARDIOVASCULAR: No edema  RESPIRATORY: Normal respirations  HEME/LYMPH/IMMUNO:  No regional lymphadenopathy except as noted below in "ASSESSMENT AND PLAN BY DIAGNOSIS"    SKIN:  FULL ORGAN SYSTEM EXAM   Ears, Face Normal except as noted below in Assessment   Neck, Cervical Chain Nodes Normal except as noted below in Assessment   Right Arm/Hand/Fingers Normal except as noted below in Assessment   Left Arm/Hand/Fingers Normal except as noted below in Assessment   Chest/Breasts/Axillae Viewed areas Normal except as noted below in Assessment   Abdomen, Umbilicus Normal except as noted below in Assessment   Back/Spine Normal except as noted below in Assessment        Assessment and Plan by Diagnosis:    History of Present Condition:     Duration:  How long has this been an issue for you?    o  1-2 years    Location Affected:  Where on the body is this affecting you?    o  Neck and arm    Quality:  Is there any bleeding, pain, itch, burning/irritation, or redness associated with the skin lesion?    o  Denies    Severity:  Describe any bleeding, pain, itch, burning/irritation, or redness on a scale of 1 to 10 (with 10 being the worst)  o  Denies    Timing:  Does this condition seem to be there pretty constantly or do you notice it more at specific times throughout the day?    o  Constant    Context:  Have you ever noticed that this condition seems to be associated with specific activities you do?    o  Denies    Modifying Factors:    o Anything that seems to make the condition worse?    -  Denies   o What have you tried to do to make the condition better?     -  Denies     Associated Signs and Symptoms:  Does this skin lesion seem to be associated with any of the following:  o  SL AMB DERM SIGNS AND SYMPTOMS: Prevent you from things you enjoy doing     1  IRRITATED SUPERFICIAL ULCER  Physical Exam:   Anatomic Location Affected:  Left mid back    Morphological Description:  Round erythematous papule with superficial ulcer  Additional History of Present Condition:  Patient present for skin exam and she has been picking at it  Rubs against bra strap  Present for 1 week     Assessment and Plan:  Based on a thorough discussion of this condition and the management approach to it (including a comprehensive discussion of the known risks, side effects and potential benefits of treatment), the patient (family) agrees to implement the following specific plan:   Recommend keep covered and apply Vaseline  2  ACROCHORDON ("SKIN TAG")    Physical Exam:   Anatomic Location Affected:  Left upper arm    Morphological Description:  Skin colored papule     Additional History of Present Condition:  Patient present for skin exam     Assessment and Plan:  Based on a thorough discussion of this condition and the management approach to it (including a comprehensive discussion of the known risks, side effects and potential benefits of treatment), the patient (family) agrees to implement the following specific plan:   No treatment necessary       3  NEOPLASM OF UNCERTAIN BEHAVIOR OF SKIN    Physical Exam:   (Anatomic Location); (Size and Morphological Description); (Differential Diagnosis):  o Specimen A: Left neck; firm subcutaneous papule; Dd/x Cyst      Assessment and Plan:   I have discussed with the patient that a sample of skin via a "skin biopsy would be potentially helpful to further make a specific diagnosis under the microscope   Based on a thorough discussion of this condition and the management approach to it (including a comprehensive discussion of the known risks, side effects and potential benefits of treatment), the patient (family) agrees to implement the following specific plan:    o Procedure:  Skin Biopsy    After a thorough discussion of treatment options and risk/benefits/alternatives (including but not limited to local pain, scarring, dyspigmentation, blistering, possible superinfection, and inability to confirm a diagnosis via histopathology), verbal and written consent were obtained and portion of the rash was biopsied for tissue sample  See below for consent that was obtained from patient and subsequent Procedure Note   o       Punch Biopsy preformed; will follow up with results  PROCEDURE NOTE:  PUNCH BIOPSY      Performing Physician: Caity Franco    Anatomic Location; Clinical Description with size (cm); Pre-Op Diagnosis:     Specimen A: Left neck; firm subcutaneous papule; Dd/x Cyst       Anesthesia: 1% xylocaine with epi       Topical anesthesia: None       Indications: To indicate diagnosis and management plan  Procedure Details     Patient informed of the risks (including bleeding,scaring and infection) and benefits of the procedure explained  Verbal and written informed consent obtained  The area was prepped and draped in the usual fashion  Anesthesia was obtained with 1% lidocaine with epinephrine  The skin was then stretched perpendicular to the skin tension lines and a punch biopsy to an appropriate sampling depth was obtained with a 3 mm punch with a forceps and iris scissors  Hemostasis was obtained with 5-0 Ethilon x 2 sutures  Complications:  None      Specimen has been sent for review by Dermatopathology  Plan:  1  Instructed to keep the wound dry and covered for 24-48h and clean thereafter  2  Warning signs of infection were reviewed  3  Recommended that the patient use acetaminophen as needed for pain  4  Sutures if any should be removed in 7 days      Standard post-procedure care has been explained and has been included in written form within the patient's copy of Informed Consent           4  POISON IVY DERMATITIS   Physical Exam:   Anatomic Location Affected:  Bilateral arms  Morphological Description:  Linear erythematous papules and plaques  Additional History of Present Condition:  Patient present for skin exam      Assessment and Plan:  Based on a thorough discussion of this condition and the management approach to it (including a comprehensive discussion of the known risks, side effects and potential benefits of treatment), the patient (family) agrees to implement the following specific plan:   Triamcinolone 0 1%ointment prescribed; Apply twice a day until rash clears   Follow up as needed       Scribe Attestation    I,:  Vonnie Collado MA am acting as a scribe while in the presence of the attending physician :       I,:  Zuri Elkins MD personally performed the services described in this documentation    as scribed in my presence :

## 2021-04-20 ENCOUNTER — OFFICE VISIT (OUTPATIENT)
Dept: DERMATOLOGY | Facility: CLINIC | Age: 46
End: 2021-04-20

## 2021-04-20 ENCOUNTER — TELEPHONE (OUTPATIENT)
Dept: DERMATOLOGY | Facility: CLINIC | Age: 46
End: 2021-04-20

## 2021-04-20 DIAGNOSIS — Z48.02 ENCOUNTER FOR REMOVAL OF SUTURES: Primary | ICD-10-CM

## 2021-04-20 PROCEDURE — RECHECK: Performed by: STUDENT IN AN ORGANIZED HEALTH CARE EDUCATION/TRAINING PROGRAM

## 2021-04-20 NOTE — PROGRESS NOTES
Suture removal    Date/Time: 4/20/2021 3:39 PM  Performed by: Tamar Mae RN  Authorized by: Indu Diego MD   Universal Protocol:  Consent: Verbal consent obtained  Consent given by: patient  Timeout called at: 4/20/2021 3:39 PM   Patient understanding: patient states understanding of the procedure being performed  Patient consent: the patient's understanding of the procedure matches consent given  Procedure consent: procedure consent matches procedure scheduled  Relevant documents: relevant documents present and verified  Test results: test results not available  Site marked: the operative site was not marked  Radiology Images displayed and confirmed  If images not available, report reviewed: imaging studies not available  Patient identity confirmed: verbally with patient        Patient location:  Clinic  Location:     Laterality:  Left    Location:  [address] location:  Neck  Procedure details: Tools used:  Suture removal kit    Wound appearance:  No sign(s) of infection, good wound healing, clean and pink    Number of sutures removed:  2  Post-procedure details:     Post-removal:  Band-Aid applied (Vaseline applied )    Patient tolerance of procedure:   Tolerated well, no immediate complications

## 2021-04-26 ENCOUNTER — IMMUNIZATIONS (OUTPATIENT)
Dept: FAMILY MEDICINE CLINIC | Facility: HOSPITAL | Age: 46
End: 2021-04-26

## 2021-04-26 ENCOUNTER — TELEPHONE (OUTPATIENT)
Dept: ADMINISTRATIVE | Facility: OTHER | Age: 46
End: 2021-04-26

## 2021-04-26 ENCOUNTER — OFFICE VISIT (OUTPATIENT)
Dept: FAMILY MEDICINE CLINIC | Facility: CLINIC | Age: 46
End: 2021-04-26
Payer: COMMERCIAL

## 2021-04-26 VITALS
TEMPERATURE: 98 F | WEIGHT: 195 LBS | BODY MASS INDEX: 36.82 KG/M2 | OXYGEN SATURATION: 98 % | DIASTOLIC BLOOD PRESSURE: 76 MMHG | SYSTOLIC BLOOD PRESSURE: 132 MMHG | HEART RATE: 92 BPM | HEIGHT: 61 IN

## 2021-04-26 DIAGNOSIS — Z00.00 ANNUAL PHYSICAL EXAM: Primary | ICD-10-CM

## 2021-04-26 DIAGNOSIS — Z23 ENCOUNTER FOR IMMUNIZATION: Primary | ICD-10-CM

## 2021-04-26 DIAGNOSIS — E66.01 CLASS 2 SEVERE OBESITY DUE TO EXCESS CALORIES WITH SERIOUS COMORBIDITY AND BODY MASS INDEX (BMI) OF 36.0 TO 36.9 IN ADULT (HCC): ICD-10-CM

## 2021-04-26 DIAGNOSIS — Z79.4 TYPE 2 DIABETES MELLITUS WITHOUT COMPLICATION, WITH LONG-TERM CURRENT USE OF INSULIN (HCC): ICD-10-CM

## 2021-04-26 DIAGNOSIS — E11.9 TYPE 2 DIABETES MELLITUS WITHOUT COMPLICATION, WITH LONG-TERM CURRENT USE OF INSULIN (HCC): ICD-10-CM

## 2021-04-26 LAB — SL AMB POCT HEMOGLOBIN AIC: 6.7 (ref ?–6.5)

## 2021-04-26 PROCEDURE — 83036 HEMOGLOBIN GLYCOSYLATED A1C: CPT | Performed by: FAMILY MEDICINE

## 2021-04-26 PROCEDURE — 99213 OFFICE O/P EST LOW 20 MIN: CPT | Performed by: FAMILY MEDICINE

## 2021-04-26 PROCEDURE — 3044F HG A1C LEVEL LT 7.0%: CPT | Performed by: FAMILY MEDICINE

## 2021-04-26 PROCEDURE — 99396 PREV VISIT EST AGE 40-64: CPT | Performed by: FAMILY MEDICINE

## 2021-04-26 PROCEDURE — 1036F TOBACCO NON-USER: CPT | Performed by: FAMILY MEDICINE

## 2021-04-26 PROCEDURE — 0002A SARS-COV-2 / COVID-19 MRNA VACCINE (PFIZER-BIONTECH) 30 MCG: CPT

## 2021-04-26 PROCEDURE — 91300 SARS-COV-2 / COVID-19 MRNA VACCINE (PFIZER-BIONTECH) 30 MCG: CPT

## 2021-04-26 PROCEDURE — 3008F BODY MASS INDEX DOCD: CPT | Performed by: FAMILY MEDICINE

## 2021-04-26 RX ORDER — ROSUVASTATIN CALCIUM 5 MG/1
5 TABLET, COATED ORAL DAILY
Qty: 90 TABLET | Refills: 3 | Status: SHIPPED | OUTPATIENT
Start: 2021-04-26 | End: 2022-04-25

## 2021-04-26 NOTE — TELEPHONE ENCOUNTER
Upon review of the In Basket request and the patient's chart, initial outreach has been made via fax, please see Contacts section for details       Thank you  Laurie Francis

## 2021-04-26 NOTE — ASSESSMENT & PLAN NOTE
Lab Results   Component Value Date    HGBA1C 6 7 (A) 04/26/2021     Chronic, controlled  Foot exam done today  Obtain retinopathy screening report  Continue Soliqua 50u HS, metformin 1000mg BID  Discussed benefits of statin and ACE therapy in patients with diabetes, she agrees to consider statin  Crestor 5mg sent to pharmacy  Defers ACE at this time  Recommend pneumovax and Tdap boosters, but defer at this time due to Covid-19 vaccination timing

## 2021-04-26 NOTE — PROGRESS NOTES
Le Mendiola 1975 female MRN: [id number]  [address]    Visit to Establish Care: Family Medicine    Assessment/Plan     Type 2 diabetes mellitus without complication [facility])    Lab Results   Component Value Date    HGBA1C 6 7 (A) 04/26/2021     Chronic, controlled  Foot exam done today  Obtain retinopathy screening report  Continue Soliqua 50u HS, metformin 1000mg BID  Discussed benefits of statin and ACE therapy in patients with diabetes, she agrees to consider statin  Crestor 5mg sent to pharmacy  Defers ACE at this time  Recommend pneumovax and Tdap boosters, but defer at this time due to Covid-19 vaccination timing  Wero Kirkland was seen today for physical exam     Diagnoses and all orders for this visit:    Annual physical exam    Type 2 diabetes mellitus without complication, with long-term current use of insulin ([facility] [address] )  -     Lipid Panel with Direct LDL reflex; Future  -     Hemoglobin A1C; Future  -     Comprehensive metabolic panel; Future  -     CBC; Future  -     Microalbumin / creatinine urine ratio; Future  -     UA (URINE) with reflex to Scope; Future  -     TSH, 3rd generation with Free T4 reflex; Future  -     rosuvastatin (CRESTOR) 5 mg tablet; Take 1 tablet (5 mg total) by mouth daily  -     POCT hemoglobin A1c    Class 2 severe obesity due to excess calories with serious comorbidity and body mass index (BMI) of 36 0 to 36 9 in adult [facility])        In addition to the above, the patient was counseled on general preventative health care subjects, including but not limited to:  - Nutrition, healthy weight, aerobic and weight-bearing exercise  - Mental health, social support, and self care  - Advised of the importance of dental hygiene and routine dental visits   - Patient made aware of  services at the office      Future Appointments   Date Time Provider Jason Matthews   4/26/2021  6:30 PM ERICKA Bob    5/10/2021 10:00 AM Luz Maria Uriarte MD ORTHO AND [facility]   9/13/2021  7:00 AM ALIA Calles-SRUTHI MINA [facility]   10/26/2021  8:00 AM Cameron Tran MD [facility]         Cameron Tran MD  [address]  4/26/2021      Please be aware that this note contains text that was dictated and there may be errors pertaining to "sound-alike" words during the dictation process  SUBJECTIVE    HPI:  Anahi Oakes is a 54 y o  female who presented to establish care with this family medicine practice  DM Type 2 - was diagnosed at 25yo  Initially on orals, and now on insulin and metformin  Does get foot exams at endo office  Goes for ophthal exams diabetic screening  Not on statin or ACE - patient reports was not discussed, but she's open to it  Obesity - patient was following [employer]  She notes difficulty with staying active while also following a diet  Review of Systems   Constitutional: Negative for activity change, chills and fever  HENT: Negative for congestion, rhinorrhea and sore throat  Eyes: Negative for visual disturbance  Respiratory: Negative for cough, shortness of breath and wheezing  Cardiovascular: Negative for chest pain and palpitations  Gastrointestinal: Negative for abdominal pain, blood in stool, constipation, diarrhea, nausea and vomiting  Genitourinary: Negative for dysuria  Musculoskeletal: Negative for arthralgias and myalgias  Skin: Negative for rash  Neurological: Negative for dizziness, weakness and headaches  Psychiatric/Behavioral: Negative for dysphoric mood and sleep disturbance  All other systems reviewed and are negative        Historical Information   Past Medical History:   Diagnosis Date    Breast lump     last assessed: 09/25/13    Diabetes mellitus ([facility] )     [facility]     Eustachian tube dysfunction     last assessed: 12/14/15    Hx of tubal ligation     Vagina, candidiasis last assessed: 13     Past Surgical History:   Procedure Laterality Date     SECTION      x3    TUBAL LIGATION  12    WISDOM TOOTH EXTRACTION       Family History   Problem Relation Age of Onset    Arthritis Mother     Diabetes Mother     Diabetes Maternal Grandmother     Heart disease Family     Hypertension Family     No Known Problems Father     Breast cancer Neg Hx      Social History     Socioeconomic History    Marital status: /Civil Union     Spouse name: Not on file    Number of children: 3    Years of education: Not on file    Highest education level: Not on file   Occupational History    Not on file   Social Needs    Financial resource strain: Not hard at all   [employer] insecurity     Worry: Never true     Inability: Never true   [employer] needs     Medical: No     Non-medical: No   Tobacco Use    Smoking status: Never Smoker    Smokeless tobacco: Never Used   Substance and Sexual Activity    Alcohol use: Yes     Alcohol/week: 0 0 standard drinks     Frequency: Monthly or less     Drinks per session: 1 or 2     Binge frequency: Never     Comment: Only occasionally or socially    Drug use: Never    Sexual activity: Yes     Partners: Male     Birth control/protection: Female Sterilization   Lifestyle    Physical activity     Days per week: 0 days     Minutes per session: 0 min    Stress:  Only a little   Relationships    Social connections     Talks on phone: Not on file     Gets together: Not on file     Attends Quaker service: Not on file     Active member of club or organization: Not on file     Attends meetings of clubs or organizations: Not on file     Relationship status:     Intimate partner violence     Fear of current or ex partner: Not on file     Emotionally abused: Not on file     Physically abused: Not on file     Forced sexual activity: Not on file   Other Topics Concern    Not on file   Social History Narrative    Coffee Exercise frequently     OB History        4    Para   3    Term   3       0    AB   1    Living   3       SAB   1    TAB   0    Ectopic   0    Multiple   0    Live Births   3               Medications:      Current Outpatient Medications:     BD ULTRA-FINE LANCETS lancets, USE TO INJECT INSULIN ONCE DAILY AS DIRECTED (FURTHER REFILLS TO BE GIVEN AT UPCOMING APPOINTMENT), Disp: , Rfl:     Insulin Glargine-Lixisenatide (SOLIQUA) 100 units-33 mcg/mL injection pen, Inject 50 Units under the skin, Disp: , Rfl:     metFORMIN (GLUCOPHAGE) 1000 MG tablet, TAKE 1 TABLET TWICE A DAY (1 TABLET IN THE MORNING AND 1 TABLET AT SUPPER), Disp: , Rfl:     Misc Natural Products (BLOOD SUGAR 360 PO), One touch verio strips Testing BID, Disp: , Rfl:     rosuvastatin (CRESTOR) 5 mg tablet, Take 1 tablet (5 mg total) by mouth daily, Disp: 90 tablet, Rfl: 3    No Known Allergies    Most Recent Immunizations   Administered Date(s) Administered    INFLUENZA 2020    Influenza, injectable, quadrivalent, preservative free 0 5 mL 2020    SARS-CoV-2 / COVID-19 mRNA IM (Pfizer-BioNTech) 2021       OBJECTIVE  Vitals:   Vitals:    21 0828   BP: 132/76   Pulse: 92   Temp: 98 °F (36 7 °C)   SpO2: 98%   Weight: 88 5 kg (195 lb)   Height: 5' 1" (1 549 m)     Wt Readings from Last 3 Encounters:   21 88 5 kg (195 lb)   21 84 4 kg (186 lb)   20 81 6 kg (180 lb)     Body mass index is 36 84 kg/m²  BP Readings from Last 3 Encounters:   21 132/76   20 133/69   10/05/20 145/83     Patient's last menstrual period was 2021 (approximate)  Physical Exam  Vitals signs and nursing note reviewed  Constitutional:       General: She is not in acute distress  Appearance: She is well-developed  She is not ill-appearing  HENT:      Head: Normocephalic and atraumatic        Right Ear: Tympanic membrane, ear canal and external ear normal       Left Ear: Tympanic membrane, ear canal and external ear normal       Nose: Nose normal       Mouth/Throat:      Pharynx: Uvula midline  No oropharyngeal exudate  Tonsils: No tonsillar exudate  Eyes:      Conjunctiva/sclera: Conjunctivae normal    Neck:      Thyroid: No thyromegaly  Cardiovascular:      Rate and Rhythm: Normal rate and regular rhythm  Pulses: no weak pulses          Dorsalis pedis pulses are 2+ on the right side and 2+ on the left side  Posterior tibial pulses are 2+ on the right side and 2+ on the left side  Heart sounds: Normal heart sounds  No murmur  Pulmonary:      Effort: Pulmonary effort is normal  No respiratory distress  Breath sounds: Normal breath sounds  No decreased breath sounds, wheezing, rhonchi or rales  Abdominal:      General: Bowel sounds are normal  There is no distension  Palpations: Abdomen is soft  Tenderness: There is no abdominal tenderness  Feet:      Right foot:      Skin integrity: Callus present  No ulcer, skin breakdown, erythema, warmth or dry skin  Left foot:      Skin integrity: No ulcer, skin breakdown, erythema, warmth, callus or dry skin  Lymphadenopathy:      Cervical: No cervical adenopathy  Skin:     General: Skin is warm and dry  Capillary Refill: Capillary refill takes less than 2 seconds  Neurological:      Mental Status: She is alert and oriented to person, place, and time  Sensory: No sensory deficit  Motor: No abnormal muscle tone  Deep Tendon Reflexes: Reflexes normal       Diabetic Foot Exam    Patient's shoes and socks removed  Right Foot/Ankle   Right Foot Inspection  Skin Exam: skin normal, skin intact, callus and callus no dry skin, no warmth, no erythema, no maceration, no abnormal color, no pre-ulcer and no ulcer                          Toe Exam: ROM and strength within normal limits  Sensory   Vibration: intact    Monofilament testing: intact  Vascular  Capillary refills: < 3 seconds  The right DP pulse is 2+  The right PT pulse is 2+  Left Foot/Ankle  Left Foot Inspection  Skin Exam: skin normal and skin intactno dry skin, no warmth, no erythema, no maceration, normal color, no pre-ulcer, no ulcer and no callus                         Toe Exam: ROM and strength within normal limits                   Sensory   Vibration: intact    Monofilament: intact  Vascular  Capillary refills: < 3 seconds  The left DP pulse is 2+  The left PT pulse is 2+  Assign Risk Category:  No deformity present; No loss of protective sensation; No weak pulses       Risk: 0    Lab:  I have personally reviewed all pertinent results  Imaging:  I have personally reviewed all pertinent results

## 2021-04-26 NOTE — LETTER
Diabetic Eye Exam Form    Date Requested: 21  Patient: Oswaldo Singh  Patient : 1975   Referring Provider: Shazia Fontana MD    Dilated Retinal Exam, Optomap-Iris Exam, or Fundus Photography Done         Yes (Kenaitze one above)         No     Date of Diabetic Eye Exam ______________________________  Left Eye      Exam did show retinopathy    Exam did not show retinopathy         Mild       Moderate       None       Proliferative       Severe     Right Eye     Exam did show retinopathy    Exam did not show retinopathy         Mild       Moderate       None       Proliferative       Severe     Comments __________________________________________________________    Practice Providing Exam ______________________________________________    Exam Performed By (print name) _______________________________________      Provider Signature ___________________________________________________      These reports are needed for  compliance    Please fax this completed form and a copy of the Diabetic Eye Exam report to our office located at [place] [address] as soon as possible to [phone] eusebia Love: Phone [phone]    We thank you for your assistance in treating our mutual patient

## 2021-04-26 NOTE — TELEPHONE ENCOUNTER
----- Message from Tomas Negron sent at 4/26/2021  9:37 AM EDT -----  Regarding: Diabetic Eye  Contact: [phone]  04/26/21 9:41 AM    Hello, our patient Nahid Mariee has had Diabetic Eye Exam completed/performed  Please assist in updating the patient chart by pulling a previous Electronic Medical Record (EMR) document   The previous EMR is [email or web address]  The date of service is 12/2020    Thank you,  America Bahena MA  [facility] [place]

## 2021-04-26 NOTE — PROGRESS NOTES
[address]    NAME: Lieutenant Angeles  AGE: 54 y o  SEX: female  : 1975   DATE: 2021     Assessment and Plan:   Immunizations and preventive care screenings were discussed with patient today  Appropriate education was printed on patient's after visit summary  Counseling:  Alcohol/drug use: discussed moderation in alcohol intake, the recommendations for healthy alcohol use, and avoidance of illicit drug use  Dental Health: discussed importance of regular tooth brushing, flossing, and dental visits  Injury prevention: discussed safety/seat belts, safety helmets, smoke detectors, carbon dioxide detectors, and smoking near bedding or upholstery  Sexual health: discussed sexually transmitted diseases, partner selection, use of condoms, avoidance of unintended pregnancy, and contraceptive alternatives  · Exercise: the importance of regular exercise/physical activity was discussed  Recommend exercise 3-5 times per week for at least 30 minutes  BMI Counseling: Body mass index is 36 84 kg/m²  The BMI is above normal  Nutrition recommendations include decreasing portion sizes, encouraging healthy choices of fruits and vegetables and limiting drinks that contain sugar  Exercise recommendations include moderate physical activity 150 minutes/week, exercising 3-5 times per week and strength training exercises  Patient referred to PCP due to patient being overweight  Return in about 6 months (around 10/26/2021) for Next scheduled follow up  Chief Complaint:     Chief Complaint   Patient presents with    Physical Exam      History of Present Illness:     Adult Annual Physical   Patient here for a comprehensive physical exam  The patient reports no problems  Diet and Physical Activity  · Diet/Nutrition: diabetic diet  · Exercise: walking        Depression Screening  PHQ-9 Depression Screening    PHQ-9: Frequency of the following problems over the past two weeks:      Little interest or pleasure in doing things: 0 - not at all  Feeling down, depressed, or hopeless: 0 - not at all       [address]  · Sleep: sleeps well  · Hearing: normal - bilateral   · Vision: goes for regular eye exams, most recent eye exam <1 year ago and wears glasses  · Dental: regular dental visits  /GYN Health  · Patient is: premenopausal  · Last menstrual period: Patient's last menstrual period was 2021 (approximate)  · Contraceptive method: BTL  Review of Systems:     Review of Systems   Constitutional: Negative for activity change, chills and fever  HENT: Negative for congestion, rhinorrhea and sore throat  Eyes: Negative for visual disturbance  Respiratory: Negative for cough, shortness of breath and wheezing  Cardiovascular: Negative for chest pain and palpitations  Gastrointestinal: Negative for abdominal pain, blood in stool, constipation, diarrhea, nausea and vomiting  Genitourinary: Negative for dysuria  Musculoskeletal: Negative for arthralgias and myalgias  Skin: Negative for rash  Neurological: Negative for dizziness, weakness and headaches  Psychiatric/Behavioral: Negative for dysphoric mood  All other systems reviewed and are negative       Past Medical History:     Past Medical History:   Diagnosis Date    Breast lump     last assessed: 13    Diabetes mellitus ([facility] [address] )     Diabetes mellitus type II, controlled ([facility] [address] )     Eustachian tube dysfunction     last assessed: 12/14/15    Hx of tubal ligation     Vagina, candidiasis     last assessed: 13      Past Surgical History:     Past Surgical History:   Procedure Laterality Date     SECTION      x3    TUBAL LIGATION  12    WISDOM TOOTH EXTRACTION        Social History:     E-Cigarette/Vaping    E-Cigarette Use Never User      E-Cigarette/Vaping Substances    Nicotine No     THC No     CBD No  Flavoring No     Other No     Unknown No      Social History     Socioeconomic History    Marital status: /Civil Union     Spouse name: None    Number of children: None    Years of education: None    Highest education level: None   Occupational History    None   Social Needs    Financial resource strain: None    Food insecurity     Worry: None     Inability: None    Transportation needs     Medical: None     Non-medical: None   Tobacco Use    Smoking status: Never Smoker    Smokeless tobacco: Never Used   Substance and Sexual Activity    Alcohol use:  Yes     Alcohol/week: 0 0 standard drinks     Frequency: Never     Drinks per session: 1 or 2     Binge frequency: Never     Comment: Only occasionally or socially    Drug use: Never    Sexual activity: Yes     Partners: Male     Birth control/protection: Female Sterilization   Lifestyle    Physical activity     Days per week: 0 days     Minutes per session: 0 min    Stress: None   Relationships    Social connections     Talks on phone: None     Gets together: None     Attends Latter day service: None     Active member of club or organization: None     Attends meetings of clubs or organizations: None     Relationship status: None    Intimate partner violence     Fear of current or ex partner: None     Emotionally abused: None     Physically abused: None     Forced sexual activity: None   Other Topics Concern    None   Social History Narrative    Coffee    Exercise frequently      Family History:     Family History   Problem Relation Age of Onset    Arthritis Mother     Diabetes Mother     Diabetes Maternal Grandmother     Heart disease Family     Hypertension Family     No Known Problems Father     Breast cancer Neg Hx       Current Medications:     Current Outpatient Medications   Medication Sig Dispense Refill    BD ULTRA-FINE LANCETS lancets USE TO INJECT INSULIN ONCE DAILY AS DIRECTED (FURTHER REFILLS TO BE GIVEN AT UPCOMING APPOINTMENT)      Insulin Glargine-Lixisenatide (SOLIQUA) 100 units-33 mcg/mL injection pen Inject 50 Units under the skin      metFORMIN (GLUCOPHAGE) 1000 MG tablet TAKE 1 TABLET TWICE A DAY (1 TABLET IN THE MORNING AND 1 TABLET AT SUPPER)      Misc Natural Products (BLOOD SUGAR 360 PO) One touch verio strips Testing BID      rosuvastatin (CRESTOR) 5 mg tablet Take 1 tablet (5 mg total) by mouth daily 90 tablet 3     No current facility-administered medications for this visit  Allergies:     No Known Allergies   Physical Exam:     /76   Pulse 92   Temp 98 °F (36 7 °C)   Ht 5' 1" (1 549 m)   Wt 88 5 kg (195 lb)   LMP 04/05/2021 (Approximate)   SpO2 98%   BMI 36 84 kg/m²     Physical Exam  Vitals signs and nursing note reviewed  Constitutional:       General: She is not in acute distress  Appearance: She is well-developed  She is obese  She is not ill-appearing  HENT:      Head: Normocephalic and atraumatic  Right Ear: Tympanic membrane, ear canal and external ear normal  No middle ear effusion  Left Ear: Tympanic membrane, ear canal and external ear normal   No middle ear effusion  Nose: Nose normal  No congestion or rhinorrhea  Mouth/Throat:      Lips: Pink  Mouth: Mucous membranes are moist       Pharynx: Oropharynx is clear  Uvula midline  No oropharyngeal exudate  Tonsils: No tonsillar exudate  Eyes:      General: Lids are normal       Extraocular Movements: Extraocular movements intact  Conjunctiva/sclera: Conjunctivae normal       Pupils: Pupils are equal, round, and reactive to light  Neck:      Thyroid: No thyromegaly  Trachea: No tracheal deviation  Cardiovascular:      Rate and Rhythm: Normal rate and regular rhythm  Pulses: Normal pulses  Heart sounds: Normal heart sounds, S1 normal and S2 normal  No murmur  Pulmonary:      Effort: Pulmonary effort is normal  No respiratory distress        Breath sounds: Normal breath sounds  No decreased breath sounds, wheezing, rhonchi or rales  Abdominal:      General: Bowel sounds are normal  There is no distension  Palpations: Abdomen is soft  Tenderness: There is no abdominal tenderness  Musculoskeletal:      Right lower leg: No edema  Left lower leg: No edema  Lymphadenopathy:      Cervical: No cervical adenopathy  Skin:     General: Skin is warm and dry  Capillary Refill: Capillary refill takes less than 2 seconds  Neurological:      Mental Status: She is alert and oriented to person, place, and time  Cranial Nerves: Cranial nerves are intact  Deep Tendon Reflexes: Reflexes normal       Reflex Scores:       Patellar reflexes are 2+ on the right side and 2+ on the left side  Psychiatric:         Attention and Perception: Attention normal          Mood and Affect: Mood normal          Thought Content: Thought content does not include suicidal ideation  Yung Potts MD  [address]     BMI Counseling: Body mass index is 36 84 kg/m²  The BMI is above normal  Nutrition recommendations include reducing portion sizes, decreasing overall calorie intake and 3-5 servings of fruits/vegetables daily  Exercise recommendations include moderate aerobic physical activity for 150 minutes/week

## 2021-04-28 ENCOUNTER — TELEPHONE (OUTPATIENT)
Dept: ADMINISTRATIVE | Facility: OTHER | Age: 46
End: 2021-04-28

## 2021-04-28 NOTE — TELEPHONE ENCOUNTER
----- Message from Ana Nickerson MD sent at 4/27/2021  3:06 PM EDT -----  04/27/21 3:07 PM    Hello, our patient Ray Quinn has had Diabetic Eye Exam completed/performed  Please assist in updating the patient chart by pulling the document from the Media Tab        Thank you,  Ana Nickerson MD  [facility]    ----- Message -----  From: Interface, Transcription Incoming  Sent: 4/27/2021   2:46 PM EDT  To: Ana Nickerson MD

## 2021-04-28 NOTE — TELEPHONE ENCOUNTER
Upon review of the In Basket request we have found this is a duplicate request and no further action is needed  This request is currently being processed and documentation is being completed in the initial request  This message will now be closed  Any additional questions or concerns should be emailed to the Practice Liaisons via [email or web address] email, please do not reply via In Basket      Thank you  Rheba Notice

## 2021-05-10 ENCOUNTER — OFFICE VISIT (OUTPATIENT)
Dept: OBGYN CLINIC | Facility: CLINIC | Age: 46
End: 2021-05-10
Payer: COMMERCIAL

## 2021-05-10 VITALS
DIASTOLIC BLOOD PRESSURE: 80 MMHG | WEIGHT: 195 LBS | HEART RATE: 90 BPM | HEIGHT: 61 IN | BODY MASS INDEX: 36.82 KG/M2 | SYSTOLIC BLOOD PRESSURE: 138 MMHG

## 2021-05-10 DIAGNOSIS — M65.311 TRIGGER THUMB OF RIGHT HAND: Primary | ICD-10-CM

## 2021-05-10 PROCEDURE — 1036F TOBACCO NON-USER: CPT | Performed by: SURGERY

## 2021-05-10 PROCEDURE — 20550 NJX 1 TENDON SHEATH/LIGAMENT: CPT | Performed by: SURGERY

## 2021-05-10 PROCEDURE — 99214 OFFICE O/P EST MOD 30 MIN: CPT | Performed by: SURGERY

## 2021-05-10 PROCEDURE — 3008F BODY MASS INDEX DOCD: CPT | Performed by: SURGERY

## 2021-05-10 RX ORDER — LIDOCAINE HYDROCHLORIDE 10 MG/ML
1 INJECTION, SOLUTION INFILTRATION; PERINEURAL
Status: COMPLETED | OUTPATIENT
Start: 2021-05-10 | End: 2021-05-10

## 2021-05-10 RX ORDER — DEXAMETHASONE SODIUM PHOSPHATE 100 MG/10ML
40 INJECTION INTRAMUSCULAR; INTRAVENOUS
Status: COMPLETED | OUTPATIENT
Start: 2021-05-10 | End: 2021-05-10

## 2021-05-10 RX ADMIN — DEXAMETHASONE SODIUM PHOSPHATE 40 MG: 100 INJECTION INTRAMUSCULAR; INTRAVENOUS at 10:20

## 2021-05-10 RX ADMIN — LIDOCAINE HYDROCHLORIDE 1 ML: 10 INJECTION, SOLUTION INFILTRATION; PERINEURAL at 10:20

## 2021-05-10 NOTE — PROGRESS NOTES
Burt CORDOBA  Attending, Orthopaedic Surgery  Hand, Wrist, and Elbow Surgery  [facility]      ORTHOPAEDIC HAND, WRIST, AND ELBOW OFFICE  VISIT       ASSESSMENT/PLAN:       76-year-old female with right trigger thumb  Patient and I discussed non operative treatment of repeat corticosteroid injection versus surgical intervention  Patient has an upcoming beach trip and does not wish to proceed with any surgery at this time  She was provided with her 2nd corticosteroid injection to the right trigger thumb which was tolerated well  We discussed that glucose levels may increase after corticosteroid injection that she should continue to monitor this  She may continue to perform activities as tolerated  I will see her back on as-needed basis  The patient verbalized understanding of exam findings and treatment plan  We engaged in the shared decision-making process and treatment options were discussed at length with the patient  Surgical and conservative management discussed today along with risks and benefits  Diagnoses and all orders for this visit:    Trigger thumb of right hand  -     Hand/upper extremity injection: R thumb A1        Follow Up:  Return if symptoms worsen or fail to improve  To Do Next Visit:  Re-evaluation of current issue      General Discussions:  Trigger Finger: The anatomy and physiology of trigger finger was discussed with the patient today in the office  Edema and increased contact pressure within the flexor tendons at the A1 pulley can cause pain, crepitation, and triggering or locking of the digit resulting in limitation of function  Symptoms can occur at anytime but are typically worse in the morning or after a brief rest from repetitive activity    Treatment options include resting/nighttime MP blocking splints to decrease edema, oral anti-inflammatory medications, home or formal therapy exercises, up to 2 steroid injections within the tendon sheath, or surgical release  While majority of patients do respond to conservative treatment, up to 20% may require surgical release  Operative Discussions:        ____________________________________________________________________________________________________________________________________________      CHIEF COMPLAINT:  Chief Complaint   Patient presents with    Right Thumb - Triggering       SUBJECTIVE:  Brandin Estrella is a 54y o  year old RHD female who presents to the office today for an evaluation of her right thumb  Patient states she was previously diagnosed with a right trigger thumb by Dr Trisha Cruz in 2020  Patient reports she did received a corticosteroid injection at that time which did provide her with about 5-6 months of relief  She states today that the triggering has returned  This is painful to her  She reports the triggering is worse in morning  She notes no new injuries  She denies any numbness and tingling  She is currently working as         Pain/symptom timing:  Worse during the day when active  Pain/symptom context:  Worse with activites and work  Pain/symptom modifying factors:  Rest makes better, activities make worse  Pain/symptom associated signs/symptoms: none    Prior treatment   · NSAIDsNo   · Injections Yes   · Bracing/Orthotics No    Physical Therapy Yes     I have personally reviewed all the relevant PMH, PSH, SH, FH, Medications and allergies      PAST MEDICAL HISTORY:  Past Medical History:   Diagnosis Date    Breast lump     last assessed: 13    Diabetes mellitus [facility] [facility]     Diabetes mellitus type II, controlled ([facility] [facility] )     Eustachian tube dysfunction     last assessed: 12/14/15    Hx of tubal ligation     Vagina, candidiasis     last assessed: 13       PAST SURGICAL HISTORY:  Past Surgical History:   Procedure Laterality Date     SECTION      x3    TUBAL LIGATION  12    WISDOM TOOTH EXTRACTION         FAMILY HISTORY:  Family History   Problem Relation Age of Onset    Arthritis Mother     Diabetes Mother     Colon cancer Mother 62    Diabetes Maternal Grandmother     Heart disease Family     Hypertension Family     No Known Problems Father     Breast cancer Sister         BRCA negative       SOCIAL HISTORY:  Social History     Tobacco Use    Smoking status: Never Smoker    Smokeless tobacco: Never Used   Substance Use Topics    Alcohol use: Yes     Alcohol/week: 0 0 standard drinks     Frequency: Monthly or less     Drinks per session: 1 or 2     Binge frequency: Never     Comment: Only occasionally or socially    Drug use: Never       MEDICATIONS:    Current Outpatient Medications:     BD ULTRA-FINE LANCETS lancets, USE TO INJECT INSULIN ONCE DAILY AS DIRECTED (FURTHER REFILLS TO BE GIVEN AT UPCOMING APPOINTMENT), Disp: , Rfl:     Insulin Glargine-Lixisenatide (SOLIQUA) 100 units-33 mcg/mL injection pen, Inject 50 Units under the skin, Disp: , Rfl:     metFORMIN (GLUCOPHAGE) 1000 MG tablet, TAKE 1 TABLET TWICE A DAY (1 TABLET IN THE MORNING AND 1 TABLET AT SUPPER), Disp: , Rfl:     Misc Natural Products (BLOOD SUGAR 360 PO), One touch verio strips Testing BID, Disp: , Rfl:     rosuvastatin (CRESTOR) 5 mg tablet, Take 1 tablet (5 mg total) by mouth daily, Disp: 90 tablet, Rfl: 3    ALLERGIES:  No Known Allergies        REVIEW OF SYSTEMS:  Review of Systems   Constitutional: Negative for chills, fever and unexpected weight change  HENT: Negative for hearing loss, nosebleeds and sore throat  Eyes: Negative for pain, redness and visual disturbance  Respiratory: Negative for cough, shortness of breath and wheezing  Cardiovascular: Negative for chest pain, palpitations and leg swelling  Gastrointestinal: Negative for abdominal pain, nausea and vomiting  Endocrine: Negative for polydipsia and polyuria  Genitourinary: Negative for dyspareunia and hematuria     Musculoskeletal: Positive for myalgias  Negative for arthralgias and joint swelling  Skin: Negative for rash and wound  Neurological: Negative for dizziness, numbness and headaches  Psychiatric/Behavioral: Negative for decreased concentration and suicidal ideas  The patient is not nervous/anxious  VITALS:  Vitals:    05/10/21 0942   BP: 138/80   Pulse: 90       LABS:  HgA1c:   Lab Results   Component Value Date    HGBA1C 6 7 (A) 04/26/2021     BMP:   Lab Results   Component Value Date    GLUCOSE 78 03/29/2014    CALCIUM 9 4 03/29/2014     03/29/2014    K 3 9 03/29/2014    CO2 25 03/29/2014     03/29/2014    BUN 15 03/29/2014    CREATININE 0 55 (L) 03/29/2014       _____________________________________________________  PHYSICAL EXAMINATION:  General: well developed and well nourished, alert, oriented times 3 and appears comfortable  Psychiatric: Normal  HEENT: Normocephalic, Atraumatic Trachea Midline, No torticollis  Pulmonary: No audible wheezing or respiratory distress   Cardiovascular: No pitting edema, 2+ radial pulse   Skin: No masses, erythema, lacerations, fluctation, ulcerations  Neurovascular: Sensation Intact to the Median, Ulnar, Radial Nerve, Motor Intact to the Median, Ulnar, Radial Nerve and Pulses Intact  Musculoskeletal: Normal, except as noted in detailed exam and in HPI  MUSCULOSKELETAL EXAMINATION:  Right  thumb:  Positive palpable nodule over the A1 pulley  Positive tenderness to palpation over A1 pulley  Positive catching  Positive clicking         ___________________________________________________  STUDIES REVIEWED:  None today       PROCEDURES PERFORMED:  Hand/upper extremity injection: R thumb A1  Universal Protocol:  Consent: Verbal consent obtained    Consent given by: patient  Timeout called at: 5/10/2021 10:19 AM   Supporting Documentation  Indications: pain   Procedure Details  Condition:trigger finger Location: thumb - R thumb A1   Needle size: 25 G  Ultrasound guidance: no  Medications administered: 40 mg dexamethasone 100 mg/10 mL; 1 mL lidocaine 1 %    Patient tolerance: patient tolerated the procedure well with no immediate complications  Dressing:  Sterile dressing applied              _____________________________________________________      Scribe Attestation    I,:  Rosita Jimenez MA am acting as a scribe while in the presence of the attending physician :       I,:  Selene Garner MD personally performed the services described in this documentation    as scribed in my presence :

## 2021-06-05 ENCOUNTER — APPOINTMENT (OUTPATIENT)
Dept: LAB | Facility: MEDICAL CENTER | Age: 46
End: 2021-06-05
Payer: COMMERCIAL

## 2021-06-05 DIAGNOSIS — Z79.4 TYPE 2 DIABETES MELLITUS WITHOUT COMPLICATION, WITH LONG-TERM CURRENT USE OF INSULIN (HCC): ICD-10-CM

## 2021-06-05 DIAGNOSIS — E11.9 TYPE 2 DIABETES MELLITUS WITHOUT COMPLICATION, WITH LONG-TERM CURRENT USE OF INSULIN (HCC): ICD-10-CM

## 2021-06-05 LAB
ALBUMIN SERPL BCP-MCNC: 4 G/DL (ref 3.5–5)
ALP SERPL-CCNC: 60 U/L (ref 46–116)
ALT SERPL W P-5'-P-CCNC: 27 U/L (ref 12–78)
ANION GAP SERPL CALCULATED.3IONS-SCNC: 6 MMOL/L (ref 4–13)
AST SERPL W P-5'-P-CCNC: 13 U/L (ref 5–45)
BILIRUB SERPL-MCNC: 0.57 MG/DL (ref 0.2–1)
BILIRUB UR QL STRIP: NEGATIVE
BUN SERPL-MCNC: 13 MG/DL (ref 5–25)
CALCIUM SERPL-MCNC: 9.5 MG/DL (ref 8.3–10.1)
CHLORIDE SERPL-SCNC: 107 MMOL/L (ref 100–108)
CHOLEST SERPL-MCNC: 112 MG/DL (ref 50–200)
CLARITY UR: NORMAL
CO2 SERPL-SCNC: 25 MMOL/L (ref 21–32)
COLOR UR: YELLOW
CREAT SERPL-MCNC: 0.52 MG/DL (ref 0.6–1.3)
CREAT UR-MCNC: 89.9 MG/DL
ERYTHROCYTE [DISTWIDTH] IN BLOOD BY AUTOMATED COUNT: 13.8 % (ref 11.6–15.1)
EST. AVERAGE GLUCOSE BLD GHB EST-MCNC: 160 MG/DL
GFR SERPL CREATININE-BSD FRML MDRD: 115 ML/MIN/1.73SQ M
GLUCOSE P FAST SERPL-MCNC: 122 MG/DL (ref 65–99)
GLUCOSE UR STRIP-MCNC: NEGATIVE MG/DL
HBA1C MFR BLD: 7.2 %
HCT VFR BLD AUTO: 40.3 % (ref 34.8–46.1)
HDLC SERPL-MCNC: 42 MG/DL
HGB BLD-MCNC: 12.7 G/DL (ref 11.5–15.4)
HGB UR QL STRIP.AUTO: NEGATIVE
KETONES UR STRIP-MCNC: NEGATIVE MG/DL
LDLC SERPL CALC-MCNC: 40 MG/DL (ref 0–100)
LEUKOCYTE ESTERASE UR QL STRIP: NEGATIVE
MCH RBC QN AUTO: 29 PG (ref 26.8–34.3)
MCHC RBC AUTO-ENTMCNC: 31.5 G/DL (ref 31.4–37.4)
MCV RBC AUTO: 92 FL (ref 82–98)
MICROALBUMIN UR-MCNC: 13.9 MG/L (ref 0–20)
MICROALBUMIN/CREAT 24H UR: 15 MG/G CREATININE (ref 0–30)
NITRITE UR QL STRIP: NEGATIVE
PH UR STRIP.AUTO: 6 [PH]
PLATELET # BLD AUTO: 330 THOUSANDS/UL (ref 149–390)
PMV BLD AUTO: 11.3 FL (ref 8.9–12.7)
POTASSIUM SERPL-SCNC: 4.4 MMOL/L (ref 3.5–5.3)
PROT SERPL-MCNC: 7.6 G/DL (ref 6.4–8.2)
PROT UR STRIP-MCNC: NEGATIVE MG/DL
RBC # BLD AUTO: 4.38 MILLION/UL (ref 3.81–5.12)
SODIUM SERPL-SCNC: 138 MMOL/L (ref 136–145)
SP GR UR STRIP.AUTO: 1.02 (ref 1–1.03)
TRIGL SERPL-MCNC: 152 MG/DL
TSH SERPL DL<=0.05 MIU/L-ACNC: 2.33 UIU/ML (ref 0.36–3.74)
UROBILINOGEN UR QL STRIP.AUTO: 0.2 E.U./DL
WBC # BLD AUTO: 12.47 THOUSAND/UL (ref 4.31–10.16)

## 2021-06-05 PROCEDURE — 83036 HEMOGLOBIN GLYCOSYLATED A1C: CPT

## 2021-06-05 PROCEDURE — 84443 ASSAY THYROID STIM HORMONE: CPT

## 2021-06-05 PROCEDURE — 81003 URINALYSIS AUTO W/O SCOPE: CPT

## 2021-06-05 PROCEDURE — 80053 COMPREHEN METABOLIC PANEL: CPT

## 2021-06-05 PROCEDURE — 3061F NEG MICROALBUMINURIA REV: CPT | Performed by: SURGERY

## 2021-06-05 PROCEDURE — 82043 UR ALBUMIN QUANTITATIVE: CPT

## 2021-06-05 PROCEDURE — 36415 COLL VENOUS BLD VENIPUNCTURE: CPT

## 2021-06-05 PROCEDURE — 80061 LIPID PANEL: CPT

## 2021-06-05 PROCEDURE — 3051F HG A1C>EQUAL 7.0%<8.0%: CPT | Performed by: SURGERY

## 2021-06-05 PROCEDURE — 82570 ASSAY OF URINE CREATININE: CPT

## 2021-06-05 PROCEDURE — 85027 COMPLETE CBC AUTOMATED: CPT

## 2021-06-08 DIAGNOSIS — D72.829 LEUKOCYTOSIS, UNSPECIFIED TYPE: Primary | ICD-10-CM

## 2021-06-26 ENCOUNTER — APPOINTMENT (OUTPATIENT)
Dept: LAB | Facility: MEDICAL CENTER | Age: 46
End: 2021-06-26
Payer: COMMERCIAL

## 2021-06-26 DIAGNOSIS — D72.829 LEUKOCYTOSIS, UNSPECIFIED TYPE: ICD-10-CM

## 2021-06-26 LAB
BASOPHILS # BLD AUTO: 0.07 THOUSANDS/ΜL (ref 0–0.1)
BASOPHILS NFR BLD AUTO: 1 % (ref 0–1)
EOSINOPHIL # BLD AUTO: 0.35 THOUSAND/ΜL (ref 0–0.61)
EOSINOPHIL NFR BLD AUTO: 3 % (ref 0–6)
ERYTHROCYTE [DISTWIDTH] IN BLOOD BY AUTOMATED COUNT: 14 % (ref 11.6–15.1)
HCT VFR BLD AUTO: 40.8 % (ref 34.8–46.1)
HGB BLD-MCNC: 12.8 G/DL (ref 11.5–15.4)
IMM GRANULOCYTES # BLD AUTO: 0.05 THOUSAND/UL (ref 0–0.2)
IMM GRANULOCYTES NFR BLD AUTO: 0 % (ref 0–2)
LYMPHOCYTES # BLD AUTO: 2.95 THOUSANDS/ΜL (ref 0.6–4.47)
LYMPHOCYTES NFR BLD AUTO: 26 % (ref 14–44)
MCH RBC QN AUTO: 29.3 PG (ref 26.8–34.3)
MCHC RBC AUTO-ENTMCNC: 31.4 G/DL (ref 31.4–37.4)
MCV RBC AUTO: 93 FL (ref 82–98)
MONOCYTES # BLD AUTO: 0.96 THOUSAND/ΜL (ref 0.17–1.22)
MONOCYTES NFR BLD AUTO: 8 % (ref 4–12)
NEUTROPHILS # BLD AUTO: 7.11 THOUSANDS/ΜL (ref 1.85–7.62)
NEUTS SEG NFR BLD AUTO: 62 % (ref 43–75)
NRBC BLD AUTO-RTO: 0 /100 WBCS
PLATELET # BLD AUTO: 312 THOUSANDS/UL (ref 149–390)
PMV BLD AUTO: 11.6 FL (ref 8.9–12.7)
RBC # BLD AUTO: 4.37 MILLION/UL (ref 3.81–5.12)
WBC # BLD AUTO: 11.49 THOUSAND/UL (ref 4.31–10.16)

## 2021-06-26 PROCEDURE — 85025 COMPLETE CBC W/AUTO DIFF WBC: CPT

## 2021-06-26 PROCEDURE — 36415 COLL VENOUS BLD VENIPUNCTURE: CPT

## 2021-09-13 ENCOUNTER — ANNUAL EXAM (OUTPATIENT)
Dept: OBGYN CLINIC | Facility: MEDICAL CENTER | Age: 46
End: 2021-09-13
Payer: COMMERCIAL

## 2021-09-13 VITALS
HEIGHT: 61 IN | DIASTOLIC BLOOD PRESSURE: 74 MMHG | WEIGHT: 200.8 LBS | BODY MASS INDEX: 37.91 KG/M2 | SYSTOLIC BLOOD PRESSURE: 120 MMHG

## 2021-09-13 DIAGNOSIS — Z01.419 ENCNTR FOR GYN EXAM (GENERAL) (ROUTINE) W/O ABN FINDINGS: ICD-10-CM

## 2021-09-13 DIAGNOSIS — Z12.31 ENCOUNTER FOR SCREENING MAMMOGRAM FOR MALIGNANT NEOPLASM OF BREAST: ICD-10-CM

## 2021-09-13 PROCEDURE — 3074F SYST BP LT 130 MM HG: CPT | Performed by: PHYSICIAN ASSISTANT

## 2021-09-13 PROCEDURE — 1036F TOBACCO NON-USER: CPT | Performed by: PHYSICIAN ASSISTANT

## 2021-09-13 PROCEDURE — 99396 PREV VISIT EST AGE 40-64: CPT | Performed by: PHYSICIAN ASSISTANT

## 2021-09-13 PROCEDURE — 3078F DIAST BP <80 MM HG: CPT | Performed by: PHYSICIAN ASSISTANT

## 2021-09-13 PROCEDURE — 3008F BODY MASS INDEX DOCD: CPT | Performed by: PHYSICIAN ASSISTANT

## 2021-09-13 NOTE — PROGRESS NOTES
Jillian Fletcher  1975      CC:  Yearly exam    S:  54 y o  female here for yearly exam  Her cycles are regular, not heavy or crampy  Sexual activity: She is sexually active without pain, bleeding or dryness  Contraception: She uses tubal ligation for contraception  Last Pap 8/18/20 neg/neg  Last Mammo 6/22/21 neg  Last Colonoscopy 11/10/20 neg (due 2025)    We reviewed ASCCP guidelines for Pap testing today  Family hx of breast cancer: sister (BRCA neg)  Family hx of ovarian cancer: no  Family hx of colon cancer: mother (59)      Current Outpatient Medications:     BD ULTRA-FINE LANCETS lancets, USE TO INJECT INSULIN ONCE DAILY AS DIRECTED (FURTHER REFILLS TO BE GIVEN AT UPCOMING APPOINTMENT), Disp: , Rfl:     Insulin Glargine-Lixisenatide (SOLIQUA) 100 units-33 mcg/mL injection pen, Inject 50 Units under the skin, Disp: , Rfl:     metFORMIN (GLUCOPHAGE) 1000 MG tablet, TAKE 1 TABLET TWICE A DAY (1 TABLET IN THE MORNING AND 1 TABLET AT SUPPER), Disp: , Rfl:     Misc Natural Products (BLOOD SUGAR 360 PO), One touch verio strips Testing BID, Disp: , Rfl:     rosuvastatin (CRESTOR) 5 mg tablet, Take 1 tablet (5 mg total) by mouth daily, Disp: 90 tablet, Rfl: 3  Social History     Socioeconomic History    Marital status: /Civil Union     Spouse name: Not on file    Number of children: 3    Years of education: Not on file    Highest education level: Not on file   Occupational History    Not on file   Tobacco Use    Smoking status: Never Smoker    Smokeless tobacco: Never Used   Vaping Use    Vaping Use: Never used   Substance and Sexual Activity    Alcohol use:  Yes     Alcohol/week: 0 0 standard drinks     Comment: Only occasionally or socially    Drug use: Never    Sexual activity: Yes     Partners: Male     Birth control/protection: Female Sterilization   Other Topics Concern    Not on file   Social History Narrative    Coffee    Exercise frequently     Social Determinants of Health     Financial Resource Strain: Low Risk     Difficulty of Paying Living Expenses: Not hard at all   Food Insecurity: No Food Insecurity    Worried About Running Out of Food in the Last Year: Never true    [place] of Food in the Last Year: Never true   Transportation Needs: No Transportation Needs    Lack of Transportation (Medical): No    Lack of Transportation (Non-Medical): No   Physical Activity: Inactive    Days of Exercise per Week: 0 days    Minutes of Exercise per Session: 0 min   Stress: No Stress Concern Present    Feeling of Stress : Only a little   Social Connections: Unknown    Frequency of Communication with Friends and Family: Not on file    Frequency of Social Gatherings with Friends and Family: Not on file    Attends Anabaptist Services: Not on file    Active Member of Clubs or Organizations: Not on file    Attends Club or Organization Meetings: Not on file    Marital Status:    Intimate Partner Violence:     Fear of Current or Ex-Partner:     Emotionally Abused:     Physically Abused:     Sexually Abused:      Family History   Problem Relation Age of Onset    Arthritis Mother     Diabetes Mother     Colon cancer Mother 62    Diabetes Maternal Grandmother     Heart disease Family     Hypertension Family     No Known Problems Father     Breast cancer Sister         BRCA negative      Past Medical History:   Diagnosis Date    Breast lump     last assessed: 09/25/13    Diabetes mellitus ([facility] [address] )     [facility] type II[facility]     Eustachian tube dysfunction     last assessed: 12/14/15    Hx of tubal ligation     Vagina, candidiasis     last assessed: 05/23/13        Review of Systems   Respiratory: Negative  Cardiovascular: Negative  Gastrointestinal: Negative for constipation and diarrhea  Genitourinary: Negative for difficulty urinating, pelvic pain, vaginal bleeding, vaginal discharge, itching or odor      O:  Blood pressure 120/74, height 5' 1 02" (1 55 m), weight 91 1 kg (200 lb 12 8 oz), not currently breastfeeding  Patient appears well and is not in distress  Neck is supple without masses  Breasts are symmetrical without mass, tenderness, nipple discharge, skin changes or adenopathy  Abdomen is soft and nontender without masses  External genitals are normal without lesions or rashes  Urethral meatus and urethra are normal  Bladder is normal to palpation  Vagina is normal without discharge or bleeding  Cervix is normal without discharge or lesion  Uterus is normal, mobile, nontender without palpable mass  Adnexa are normal, nontender, without palpable mass  A:  Yearly exam      P:   Pap 2025     Mammo slip provided    Colonoscopy 2025    RTO one year for yearly exam or sooner as needed

## 2021-10-04 ENCOUNTER — RA CDI HCC (OUTPATIENT)
Dept: OTHER | Facility: HOSPITAL | Age: 46
End: 2021-10-04

## 2021-10-26 ENCOUNTER — OFFICE VISIT (OUTPATIENT)
Dept: FAMILY MEDICINE CLINIC | Facility: CLINIC | Age: 46
End: 2021-10-26
Payer: COMMERCIAL

## 2021-10-26 VITALS
BODY MASS INDEX: 38.33 KG/M2 | WEIGHT: 203 LBS | SYSTOLIC BLOOD PRESSURE: 122 MMHG | DIASTOLIC BLOOD PRESSURE: 82 MMHG | HEIGHT: 61 IN | TEMPERATURE: 98.5 F | HEART RATE: 86 BPM

## 2021-10-26 DIAGNOSIS — E11.9 TYPE 2 DIABETES MELLITUS WITHOUT COMPLICATION, WITH LONG-TERM CURRENT USE OF INSULIN (HCC): Primary | ICD-10-CM

## 2021-10-26 DIAGNOSIS — E66.01 CLASS 2 SEVERE OBESITY DUE TO EXCESS CALORIES WITH SERIOUS COMORBIDITY AND BODY MASS INDEX (BMI) OF 36.0 TO 36.9 IN ADULT (HCC): ICD-10-CM

## 2021-10-26 DIAGNOSIS — Z79.4 TYPE 2 DIABETES MELLITUS WITHOUT COMPLICATION, WITH LONG-TERM CURRENT USE OF INSULIN (HCC): Primary | ICD-10-CM

## 2021-10-26 DIAGNOSIS — Z23 IMMUNIZATION DUE: ICD-10-CM

## 2021-10-26 LAB — SL AMB POCT HEMOGLOBIN AIC: 7.5 (ref ?–6.5)

## 2021-10-26 PROCEDURE — 83036 HEMOGLOBIN GLYCOSYLATED A1C: CPT | Performed by: FAMILY MEDICINE

## 2021-10-26 PROCEDURE — 1036F TOBACCO NON-USER: CPT | Performed by: FAMILY MEDICINE

## 2021-10-26 PROCEDURE — 3079F DIAST BP 80-89 MM HG: CPT | Performed by: FAMILY MEDICINE

## 2021-10-26 PROCEDURE — 3725F SCREEN DEPRESSION PERFORMED: CPT | Performed by: FAMILY MEDICINE

## 2021-10-26 PROCEDURE — 3074F SYST BP LT 130 MM HG: CPT | Performed by: FAMILY MEDICINE

## 2021-10-26 PROCEDURE — 3051F HG A1C>EQUAL 7.0%<8.0%: CPT | Performed by: FAMILY MEDICINE

## 2021-10-26 PROCEDURE — 90471 IMMUNIZATION ADMIN: CPT | Performed by: FAMILY MEDICINE

## 2021-10-26 PROCEDURE — 99214 OFFICE O/P EST MOD 30 MIN: CPT | Performed by: FAMILY MEDICINE

## 2021-10-26 PROCEDURE — 3008F BODY MASS INDEX DOCD: CPT | Performed by: FAMILY MEDICINE

## 2021-10-26 PROCEDURE — 90732 PPSV23 VACC 2 YRS+ SUBQ/IM: CPT | Performed by: FAMILY MEDICINE

## 2021-10-26 RX ORDER — PEN NEEDLE, DIABETIC 32GX 5/32"
NEEDLE, DISPOSABLE MISCELLANEOUS
COMMUNITY
Start: 2021-10-07

## 2021-12-04 ENCOUNTER — APPOINTMENT (OUTPATIENT)
Dept: LAB | Facility: MEDICAL CENTER | Age: 46
End: 2021-12-04
Payer: COMMERCIAL

## 2021-12-04 DIAGNOSIS — Z79.4 TYPE 2 DIABETES MELLITUS WITHOUT COMPLICATION, WITH LONG-TERM CURRENT USE OF INSULIN (HCC): ICD-10-CM

## 2021-12-04 DIAGNOSIS — E11.9 TYPE 2 DIABETES MELLITUS WITHOUT COMPLICATION, WITH LONG-TERM CURRENT USE OF INSULIN (HCC): ICD-10-CM

## 2021-12-04 PROCEDURE — 80061 LIPID PANEL: CPT

## 2021-12-04 PROCEDURE — 85027 COMPLETE CBC AUTOMATED: CPT

## 2021-12-04 PROCEDURE — 84443 ASSAY THYROID STIM HORMONE: CPT

## 2021-12-04 PROCEDURE — 36415 COLL VENOUS BLD VENIPUNCTURE: CPT

## 2021-12-04 PROCEDURE — 80053 COMPREHEN METABOLIC PANEL: CPT

## 2021-12-05 LAB
ALBUMIN SERPL BCP-MCNC: 4 G/DL (ref 3.5–5)
ALP SERPL-CCNC: 63 U/L (ref 46–116)
ALT SERPL W P-5'-P-CCNC: 33 U/L (ref 12–78)
ANION GAP SERPL CALCULATED.3IONS-SCNC: 6 MMOL/L (ref 4–13)
AST SERPL W P-5'-P-CCNC: 17 U/L (ref 5–45)
BILIRUB SERPL-MCNC: 0.69 MG/DL (ref 0.2–1)
BUN SERPL-MCNC: 12 MG/DL (ref 5–25)
CALCIUM SERPL-MCNC: 9.3 MG/DL (ref 8.3–10.1)
CHLORIDE SERPL-SCNC: 108 MMOL/L (ref 100–108)
CHOLEST SERPL-MCNC: 117 MG/DL
CO2 SERPL-SCNC: 25 MMOL/L (ref 21–32)
CREAT SERPL-MCNC: 0.64 MG/DL (ref 0.6–1.3)
ERYTHROCYTE [DISTWIDTH] IN BLOOD BY AUTOMATED COUNT: 13.2 % (ref 11.6–15.1)
GFR SERPL CREATININE-BSD FRML MDRD: 107 ML/MIN/1.73SQ M
GLUCOSE P FAST SERPL-MCNC: 123 MG/DL (ref 65–99)
HCT VFR BLD AUTO: 41.3 % (ref 34.8–46.1)
HDLC SERPL-MCNC: 44 MG/DL
HGB BLD-MCNC: 12.8 G/DL (ref 11.5–15.4)
LDLC SERPL CALC-MCNC: 48 MG/DL (ref 0–100)
MCH RBC QN AUTO: 29 PG (ref 26.8–34.3)
MCHC RBC AUTO-ENTMCNC: 31 G/DL (ref 31.4–37.4)
MCV RBC AUTO: 94 FL (ref 82–98)
PLATELET # BLD AUTO: 348 THOUSANDS/UL (ref 149–390)
PMV BLD AUTO: 11.2 FL (ref 8.9–12.7)
POTASSIUM SERPL-SCNC: 4.5 MMOL/L (ref 3.5–5.3)
PROT SERPL-MCNC: 7.7 G/DL (ref 6.4–8.2)
RBC # BLD AUTO: 4.41 MILLION/UL (ref 3.81–5.12)
SODIUM SERPL-SCNC: 139 MMOL/L (ref 136–145)
TRIGL SERPL-MCNC: 125 MG/DL
TSH SERPL DL<=0.05 MIU/L-ACNC: 1.52 UIU/ML (ref 0.36–3.74)
WBC # BLD AUTO: 10.91 THOUSAND/UL (ref 4.31–10.16)

## 2021-12-06 ENCOUNTER — APPOINTMENT (OUTPATIENT)
Dept: LAB | Facility: MEDICAL CENTER | Age: 46
End: 2021-12-06
Payer: COMMERCIAL

## 2021-12-06 LAB
BILIRUB UR QL STRIP: NEGATIVE
CLARITY UR: NORMAL
COLOR UR: YELLOW
CREAT UR-MCNC: 213 MG/DL
GLUCOSE UR STRIP-MCNC: NEGATIVE MG/DL
HGB UR QL STRIP.AUTO: NEGATIVE
KETONES UR STRIP-MCNC: NEGATIVE MG/DL
LEUKOCYTE ESTERASE UR QL STRIP: NEGATIVE
MICROALBUMIN UR-MCNC: 45.8 MG/L (ref 0–20)
MICROALBUMIN/CREAT 24H UR: 22 MG/G CREATININE (ref 0–30)
NITRITE UR QL STRIP: NEGATIVE
PH UR STRIP.AUTO: 6 [PH]
PROT UR STRIP-MCNC: NEGATIVE MG/DL
SP GR UR STRIP.AUTO: 1.02 (ref 1–1.03)
UROBILINOGEN UR QL STRIP.AUTO: 0.2 E.U./DL

## 2021-12-06 PROCEDURE — 82570 ASSAY OF URINE CREATININE: CPT

## 2021-12-06 PROCEDURE — 82043 UR ALBUMIN QUANTITATIVE: CPT

## 2021-12-06 PROCEDURE — 81003 URINALYSIS AUTO W/O SCOPE: CPT

## 2022-04-19 ENCOUNTER — RA CDI HCC (OUTPATIENT)
Dept: OTHER | Facility: HOSPITAL | Age: 47
End: 2022-04-19

## 2022-04-19 PROBLEM — E11.65 TYPE 2 DIABETES MELLITUS WITH HYPERGLYCEMIA (HCC): Status: ACTIVE | Noted: 2022-04-19

## 2022-04-19 NOTE — PROGRESS NOTES
[facility]  coding opportunities          Chart Reviewed number of suggestions sent to Provider: 2     Patients Insurance        Commercial Insurance: [id number]     Based on clinical documentation indicated in your record, it appears that the patient may have the following conditions:    E11 65 Type 2 diabetes mellitus with hyperglycemia (elevated a1c)    Please review/recertify the BPA diagnoses for 2022      E66 01 Morbid Obesity     If this is correct, please assess and document during your next visit, April 26

## 2022-04-24 DIAGNOSIS — Z79.4 TYPE 2 DIABETES MELLITUS WITHOUT COMPLICATION, WITH LONG-TERM CURRENT USE OF INSULIN (HCC): ICD-10-CM

## 2022-04-24 DIAGNOSIS — E11.9 TYPE 2 DIABETES MELLITUS WITHOUT COMPLICATION, WITH LONG-TERM CURRENT USE OF INSULIN (HCC): ICD-10-CM

## 2022-04-25 RX ORDER — ROSUVASTATIN CALCIUM 5 MG/1
TABLET, COATED ORAL
Qty: 90 TABLET | Refills: 3 | Status: SHIPPED | OUTPATIENT
Start: 2022-04-25

## 2022-04-26 ENCOUNTER — OFFICE VISIT (OUTPATIENT)
Dept: FAMILY MEDICINE CLINIC | Facility: CLINIC | Age: 47
End: 2022-04-26
Payer: COMMERCIAL

## 2022-04-26 VITALS
HEART RATE: 76 BPM | TEMPERATURE: 98.5 F | DIASTOLIC BLOOD PRESSURE: 80 MMHG | BODY MASS INDEX: 37 KG/M2 | WEIGHT: 196 LBS | SYSTOLIC BLOOD PRESSURE: 120 MMHG | HEIGHT: 61 IN

## 2022-04-26 DIAGNOSIS — E66.01 CLASS 2 SEVERE OBESITY DUE TO EXCESS CALORIES WITH SERIOUS COMORBIDITY AND BODY MASS INDEX (BMI) OF 37.0 TO 37.9 IN ADULT (HCC): ICD-10-CM

## 2022-04-26 DIAGNOSIS — M26.622 ARTHRALGIA OF LEFT TEMPOROMANDIBULAR JOINT: ICD-10-CM

## 2022-04-26 DIAGNOSIS — Z79.4 TYPE 2 DIABETES MELLITUS WITHOUT COMPLICATION, WITH LONG-TERM CURRENT USE OF INSULIN (HCC): ICD-10-CM

## 2022-04-26 DIAGNOSIS — Z00.00 ANNUAL PHYSICAL EXAM: Primary | ICD-10-CM

## 2022-04-26 DIAGNOSIS — E11.9 TYPE 2 DIABETES MELLITUS WITHOUT COMPLICATION, WITH LONG-TERM CURRENT USE OF INSULIN (HCC): ICD-10-CM

## 2022-04-26 PROBLEM — E11.65 TYPE 2 DIABETES MELLITUS WITH HYPERGLYCEMIA (HCC): Status: RESOLVED | Noted: 2022-04-19 | Resolved: 2022-04-26

## 2022-04-26 LAB — SL AMB POCT HEMOGLOBIN AIC: 6.2 (ref ?–6.5)

## 2022-04-26 PROCEDURE — 83036 HEMOGLOBIN GLYCOSYLATED A1C: CPT | Performed by: FAMILY MEDICINE

## 2022-04-26 PROCEDURE — 3044F HG A1C LEVEL LT 7.0%: CPT | Performed by: FAMILY MEDICINE

## 2022-04-26 PROCEDURE — 1036F TOBACCO NON-USER: CPT | Performed by: FAMILY MEDICINE

## 2022-04-26 PROCEDURE — 3079F DIAST BP 80-89 MM HG: CPT | Performed by: FAMILY MEDICINE

## 2022-04-26 PROCEDURE — 3725F SCREEN DEPRESSION PERFORMED: CPT | Performed by: FAMILY MEDICINE

## 2022-04-26 PROCEDURE — 3008F BODY MASS INDEX DOCD: CPT | Performed by: FAMILY MEDICINE

## 2022-04-26 PROCEDURE — 3074F SYST BP LT 130 MM HG: CPT | Performed by: FAMILY MEDICINE

## 2022-04-26 PROCEDURE — 99396 PREV VISIT EST AGE 40-64: CPT | Performed by: FAMILY MEDICINE

## 2022-04-26 RX ORDER — FLASH GLUCOSE SENSOR
KIT MISCELLANEOUS
COMMUNITY
Start: 2022-02-14

## 2022-04-26 NOTE — PATIENT INSTRUCTIONS

## 2022-04-26 NOTE — PROGRESS NOTES
[address]    NAME: Kamini Villalobos  AGE: 51 y o  SEX: female  : 1975   DATE: 2022     Assessment and Plan:     Problem List Items Addressed This Visit        Endocrine    Type 2 diabetes mellitus without complication ([address] )       Lab Results   Component Value Date    HGBA1C 6 2 2022   Excellent improvements in A1c control  UTD eye exam  On statin, defers ACE at this time          Relevant Medications    Empagliflozin 10 MG TABS    Other Relevant Orders    POCT hemoglobin A1c (Completed)    Lipid Panel with Direct LDL reflex    Comprehensive metabolic panel    CBC       Other    Class 2 severe obesity due to excess calories with serious comorbidity and body mass index (BMI) of 37 0 to 37 9 in [facility])      Other Visit Diagnoses     Annual physical exam    -  Primary    Arthralgia of left temporomandibular joint            Immunizations and preventive care screenings were discussed with patient today  Appropriate education was printed on patient's after visit summary  Counseling:  Alcohol/drug use: discussed moderation in alcohol intake, the recommendations for healthy alcohol use, and avoidance of illicit drug use  Dental Health: discussed importance of regular tooth brushing, flossing, and dental visits  Exercise: the importance of regular exercise/physical activity was discussed  Recommend exercise 3-5 times per week for at least 30 minutes  BMI Counseling: Body mass index is 37 03 kg/m²  The BMI is above normal  Nutrition recommendations include decreasing portion sizes, encouraging healthy choices of fruits and vegetables and limiting drinks that contain sugar  Exercise recommendations include moderate physical activity 150 minutes/week, exercising 3-5 times per week and strength training exercises  Patient referred to PCP   Rationale for BMI follow-up plan is due to patient being overweight or obese  Depression Screening and Follow-up Plan: Patient was screened for depression during today's encounter  They screened negative with a PHQ-2 score of 0  BMI Counseling: Body mass index is 37 03 kg/m²  The BMI is above normal  Nutrition recommendations include reducing portion sizes and 3-5 servings of fruits/vegetables daily  Return in about 6 months (around 10/26/2022) for Next scheduled follow up  Chief Complaint:     Chief Complaint   Patient presents with    Physical Exam      History of Present Illness:     Adult Annual Physical   Patient here for a comprehensive physical exam  The patient reports problems - left sided jaw pain for a few days  no clicking, locking  no pain with chewign       Diet and Physical Activity  Diet/Nutrition: diabetic diet  Exercise: no formal exercise  Depression Screening  PHQ-2/9 Depression Screening    Little interest or pleasure in doing things: 0 - not at all  Feeling down, depressed, or hopeless: 0 - not at all  PHQ-2 Score: 0  PHQ-2 Interpretation: Negative depression screen       General Health  Sleep: sleeps well  Hearing: normal - bilateral   Vision: most recent eye exam <1 year ago and wears glasses  Dental: regular dental visits  /GYN Health  Patient is: premenopausal  Last menstrual period: Patient's last menstrual period was 04/10/2022 (approximate)  Review of Systems:     Review of Systems   Constitutional: Negative for activity change, chills and fever  HENT: Negative for congestion, rhinorrhea and sore throat  Eyes: Negative for visual disturbance  Respiratory: Negative for cough, shortness of breath and wheezing  Cardiovascular: Negative for chest pain and palpitations  Gastrointestinal: Negative for abdominal pain, blood in stool, constipation, diarrhea, nausea and vomiting  Genitourinary: Negative for dysuria  Musculoskeletal: Negative for arthralgias and myalgias          Left-sided jaw pain   Skin: Negative for rash  Neurological: Negative for dizziness, weakness and headaches  Psychiatric/Behavioral: Negative for dysphoric mood  All other systems reviewed and are negative  Past Medical History:     Past Medical History:   Diagnosis Date    Breast lump     last assessed: 13    Diabetes mellitus ([place] [address] )     Diabetes mellitus type II, controlled ([facility] )     Eustachian tube dysfunction     last assessed: 12/14/15    Hx of tubal ligation     Vagina, candidiasis     last assessed: 13      Past Surgical History:     Past Surgical History:   Procedure Laterality Date     SECTION      x3    TUBAL LIGATION  12    WISDOM TOOTH EXTRACTION        Social History:     Social History     Socioeconomic History    Marital status: /Civil Union     Spouse name: None    Number of children: 3    Years of education: None    Highest education level: None   Occupational History    None   Tobacco Use    Smoking status: Never Smoker    Smokeless tobacco: Never Used   Vaping Use    Vaping Use: Never used   Substance and Sexual Activity    Alcohol use: Yes     Alcohol/week: 0 0 standard drinks     Comment: Only occasionally or socially    Drug use: Never    Sexual activity: Yes     Partners: Male     Birth control/protection: Female Sterilization   Other Topics Concern    None   Social History Narrative    Coffee    Exercise frequently     Social Determinants of Health     Financial Resource Strain: Low Risk     Difficulty of Paying Living Expenses: Not hard at all   Food Insecurity: No Food Insecurity    Worried About Running Out of Food in the Last Year: Never true    [place] of Food in the Last Year: Never true   Transportation Needs: No Transportation Needs    Lack of Transportation (Medical): No    Lack of Transportation (Non-Medical):  No   Physical Activity: Inactive    Days of Exercise per Week: 0 days    Minutes of Exercise per Session: 0 min   Stress: No Stress Concern Present    Feeling of Stress : Only a little   Social Connections: Unknown    Frequency of Communication with Friends and Family: Not on file    Frequency of Social Gatherings with Friends and Family: Not on file    Attends [employer] Services: Not on file    Active Member of Clubs or Organizations: Not on file    Attends Club or Organization Meetings: Not on file    Marital Status:    Intimate Partner Violence: Not on file   Housing Stability: Not on file      Family History:     Family History   Problem Relation Age of Onset    Arthritis Mother     Diabetes Mother     Colon cancer Mother 62    Diabetes Maternal Grandmother     Heart disease Family     Hypertension Family     No Known Problems Father     Breast cancer Sister         BRCA negative      Current Medications:     Current Outpatient Medications   Medication Sig Dispense Refill    BD Pen Needle Shellie U/F 32G X 4 MM MISC       BD ULTRA-FINE LANCETS lancets USE TO INJECT INSULIN ONCE DAILY AS DIRECTED (FURTHER REFILLS TO BE GIVEN AT UPCOMING APPOINTMENT)      Continuous Blood Gluc Sensor (FreeStyle Jaxson 2 Sensor) MISC       Insulin Glargine-Lixisenatide (SOLIQUA) 100 units-33 mcg/mL injection pen Inject 60 Units under the skin daily      metFORMIN (GLUCOPHAGE) 1000 MG tablet Take 1 tablet (1,000 mg total) by mouth 2 (two) times a day with meals 180 tablet 3    Misc Natural Products (BLOOD SUGAR 360 PO) One touch verio strips Testing BID      rosuvastatin (CRESTOR) 5 mg tablet TAKE 1 TABLET BY MOUTH EVERY DAY 90 tablet 3    Empagliflozin 10 MG TABS Take 10 mg by mouth daily       No current facility-administered medications for this visit  Allergies:     No Known Allergies   Physical Exam:     /80   Pulse 76   Temp 98 5 °F (36 9 °C)   Ht 5' 1" (1 549 m)   Wt 88 9 kg (196 lb)   LMP 04/10/2022 (Approximate)   BMI 37 03 kg/m²     Physical Exam  Vitals and nursing note reviewed     Constitutional: General: She is not in acute distress  Appearance: She is well-developed  She is obese  She is not ill-appearing  HENT:      Head: Normocephalic and atraumatic  Right Ear: Tympanic membrane, ear canal and external ear normal  No middle ear effusion  Left Ear: Tympanic membrane, ear canal and external ear normal   No middle ear effusion  Nose: Nose normal  No congestion or rhinorrhea  Mouth/Throat:      Lips: Pink  Mouth: Mucous membranes are moist       Pharynx: Oropharynx is clear  Uvula midline  No oropharyngeal exudate  Tonsils: No tonsillar exudate  Comments: No jaw clicking  Mildly TTP LEFT jaw  Eyes:      General: Lids are normal       Extraocular Movements: Extraocular movements intact  Conjunctiva/sclera: Conjunctivae normal       Pupils: Pupils are equal, round, and reactive to light  Neck:      Thyroid: No thyromegaly  Trachea: No tracheal deviation  Cardiovascular:      Rate and Rhythm: Normal rate and regular rhythm  Pulses: Normal pulses  Heart sounds: Normal heart sounds, S1 normal and S2 normal  No murmur heard  Pulmonary:      Effort: Pulmonary effort is normal  No respiratory distress  Breath sounds: Normal breath sounds  No decreased breath sounds, wheezing, rhonchi or rales  Abdominal:      General: Bowel sounds are normal  There is no distension  Palpations: Abdomen is soft  Tenderness: There is no abdominal tenderness  Musculoskeletal:      Right lower leg: No edema  Left lower leg: No edema  Lymphadenopathy:      Cervical: No cervical adenopathy  Skin:     General: Skin is warm and dry  Capillary Refill: Capillary refill takes less than 2 seconds  Neurological:      Mental Status: She is alert and oriented to person, place, and time  Cranial Nerves: Cranial nerves are intact        Deep Tendon Reflexes: Reflexes normal       Reflex Scores:       Patellar reflexes are 2+ on the right side and 2+ on the left side  Psychiatric:         Attention and Perception: Attention normal          Mood and Affect: Mood normal          Thought Content: Thought content does not include suicidal ideation          MD Lia Orona

## 2022-04-26 NOTE — ASSESSMENT & PLAN NOTE
Lab Results   Component Value Date    HGBA1C 6 2 04/26/2022   Excellent improvements in A1c control  UTD eye exam  On statin, defers ACE at this time

## 2022-05-05 ENCOUNTER — TELEMEDICINE (OUTPATIENT)
Dept: FAMILY MEDICINE CLINIC | Facility: CLINIC | Age: 47
End: 2022-05-05
Payer: COMMERCIAL

## 2022-05-05 DIAGNOSIS — U07.1 COVID-19: Primary | ICD-10-CM

## 2022-05-05 PROCEDURE — 99213 OFFICE O/P EST LOW 20 MIN: CPT | Performed by: FAMILY MEDICINE

## 2022-05-05 RX ORDER — BEBTELOVIMAB 87.5 MG/ML
175 INJECTION, SOLUTION INTRAVENOUS ONCE
Status: CANCELLED | OUTPATIENT
Start: 2022-05-09

## 2022-05-05 RX ORDER — ACETAMINOPHEN 325 MG/1
650 TABLET ORAL ONCE AS NEEDED
Status: CANCELLED | OUTPATIENT
Start: 2022-05-09

## 2022-05-05 RX ORDER — SODIUM CHLORIDE 9 MG/ML
20 INJECTION, SOLUTION INTRAVENOUS ONCE
Status: CANCELLED | OUTPATIENT
Start: 2022-05-09

## 2022-05-05 RX ORDER — ONDANSETRON 2 MG/ML
4 INJECTION INTRAMUSCULAR; INTRAVENOUS ONCE AS NEEDED
Status: CANCELLED | OUTPATIENT
Start: 2022-05-09

## 2022-05-05 RX ORDER — ALBUTEROL SULFATE 90 UG/1
3 AEROSOL, METERED RESPIRATORY (INHALATION) ONCE AS NEEDED
Status: CANCELLED | OUTPATIENT
Start: 2022-05-09

## 2022-05-05 NOTE — PROGRESS NOTES
COVID-19 Outpatient Progress Note    Assessment/Plan:    Problem List Items Addressed This Visit        Other    COVID-19 - Primary         Disposition:     Patient is fully vaccinated and I recommended self quarantine for 5 days followed by strict mask use for an additional 5 days  If patient were to develop symptoms, they should immediately self isolate and call our office for further guidance  Discussed symptom directed medication options with patient  Discussed vitamin D, vitamin C, and/or zinc supplementation with patient  We reviewed quarantine for 5 days plus 5 days of strict masking  She will upload copy of her +covid test   Will schedule for infusion on Monday  Patient meets criteria for Bebtelovimab infusion  They were counseled in regards to risks, benefits, and side effects of this infusion  Theola Saran is an investigational medicine used to treat mild-to-moderate symptoms of COVID-19 in adults and children (15years of age and older weighing at least 80 pounds (40 kg)) with positive results of direct SARS-CoV-2 viral testing, and who are at high risk of progression to severe COVID-19, including hospitalization or death, and for whom other COVID-19 treatment options approved or authorized by FDA are not available or clinically appropriate  Bebtelovimab is investigational because it is still being studied  There is limited information about the safety and effectiveness of using bebtelovimab to treat people with mild-to-moderate COVID-19  The FDA has authorized the emergency use of bebtelovimab for the treatment of COVID-19 under an Emergency Use Authorization (EUA)       Theola Saran is not authorized for use in people who:  - are likely to be infected with a SARS-CoV-2 variant that is not able to be treated by bebtelovimab based on the circulating variants in your area (ask your health care provider about FDA and CDCs latest information on circulating variants by geographic area), or  - are hospitalized due to COVID-19, or  - require oxygen therapy and/or respiratory support due to COVID-19, or  - require an increase in baseline oxygen flow rate and/or respiratory support due to [address] and are on chronic oxygen therapy and/or respiratory support due to underlying nonCOVID-19 related comorbidity  How will I receive Bebtelovimab? Chanelle Blair will be given as an injection through a vein (intravenously or IV) over at least 30 seconds  You will be observed by your healthcare provider for at least 1 hour after you receive bebtelovimab  Possible side effects of Bebtelovimab: Allergic reactions can happen during and after infusion with bebtelovimab  Possible reactions include: fever, chills, nausea, headache, shortness of breath, low or high blood pressure, rapid or slow heart rate, chest discomfort or pain, weakness, confusion, feeling tired, wheezing, swelling of your lips, face, or throat, rash including hives, itching, muscle aches, dizziness, and sweating  These reactions may be severe or life threatening  Worsening symptoms after treatment: You may experience new or worsening symptoms after infusion, including fever, difficulty breathing, rapid or slow heart rate, tiredness, weakness or confusion  If these occur, contact your healthcare provider or seek immediate medical attention as some of these events have required hospitalization  It is unknown if these events are related to treatment or are due to the progression of COVID19  The side effects of getting any medicine by vein may include brief pain, bleeding, bruising of the skin, soreness, swelling, and possible infection at the infusion site  These are not all the possible side effects of bebtelovimab  Not a lot of people have been given bebtelovimab  Serious and unexpected side effects may happen  Bebtelovimab are still being studied so it is possible that all of the risks are not known at this time       It is possible that bebtelovimab could interfere with your body's own ability to fight off a future infection of SARS-CoV-2  Similarly, bebtelovimab may reduce your bodys immune response to a vaccine for SARS-CoV-2  Specific studies have not been conducted to address these possible risks  Talk to your healthcare provider if you have any questions  Emergency Use Authorization:    The [facility] FDA has made bebtelovimab available under an emergency access mechanism called an EUA  The EUA is supported by a  of Health and Human Service ([facility]) declaration that circumstances exist to justify the emergency use of drugs and biological products during the COVID-19 pandemic  Bebtelovimab have not undergone the same type of review as an FDA-approved or cleared product  The FDA may issue an EUA when certain criteria are met, which includes that there are no adequate, approved, and available alternatives  In addition, the FDA decision is based on the totality of scientific evidence available showing that it is reasonable to believe that the product meets certain criteria for safety, performance, and labeling and may be effective in treatment of patients during the COVID-19 pandemic  All of these criteria must be met to allow for the product to be used in the treatment of patients during the COVID-19 pandemic  The EUA for bebtelovimab together is in effect for the duration of the COVID-19 declaration justifying emergency use of these products, unless terminated or revoked (after which the product may no longer be used)  What if I am pregnant or breastfeeding? There is no experience treating pregnant women or breastfeeding mothers with bebtelovimab  For a mother and unborn baby, the benefit of receiving bebtelovimab may be greater than the risk from the treatment  If you are pregnant or breastfeeding, discuss your options and specific situation with your healthcare provider      How do I report side effects with Bebtelovimab? Contact your healthcare provider if you have any side effects that bother you or do not go away  Report side effects to FDA MedWatch at www fda gov/medwatch, or call 9-926-KOT-8014 or to [address]  as shown below  Email: [email or web address]   Fax number: [phone]   Telephone number: 5-639-[phone] ([phone])    Full fact sheet document for patients can be found at: [facility]    The patient consents to proceed with bebtelovimab infusion  I have spent 17 minutes directly with the patient  Greater than 50% of this time was spent in counseling/coordination of care regarding: prognosis, risks and benefits of treatment options, instructions for management, patient and family education, risk factor reductions and impressions  Encounter provider Igor Zayas MD    Provider located at [address]    Recent Visits  No visits were found meeting these conditions  Showing recent visits within past 7 days and meeting all other requirements  Today's Visits  Date Type Provider Dept   05/05/22 Telemedicine Igor Zayas MD [address]g [id number] today's visits and meeting all other requirements  Future Appointments  No visits were found meeting these conditions  Showing future appointments within next 150 days and meeting all other requirements     This virtual check-in was done via [facility] and patient was informed that this is a secure, HIPAA-compliant platform  She agrees to proceed  Patient agrees to participate in a virtual check in via telephone or video visit instead of presenting to the office to address urgent/immediate medical needs  Patient is aware this is a billable service  After connecting through [place], the patient was identified by name and date of birth   Maximilian Ch was informed that this was a telemedicine visit and that the exam was being conducted confidentially over secure lines  My office door was closed  No one else was in the room  Freddie Dave acknowledged consent and understanding of privacy and security of the telemedicine visit  I informed the patient that I have reviewed her record in Epic and presented the opportunity for her to ask any questions regarding the visit today  The patient agreed to participate  Verification of patient location:  Patient is located in the following state in which I hold an active license: [place]    Subjective:   Freddie Dave is a 51 y o  female who is concerned about COVID-19  Patient's symptoms include fever (tuesday), rhinorrhea, sore throat and cough  Patient denies chills, fatigue, malaise, congestion, anosmia, loss of taste, shortness of breath, chest tightness, abdominal pain, nausea, vomiting, diarrhea, myalgias and headaches       - Date of symptom onset: 5/3/2022      COVID-19 vaccination status: Fully vaccinated (primary series)    Exposure:   Contact with a person who is under investigation (PUI) for or who is positive for COVID-19 within the last 14 days?: No    Hospitalized recently for fever and/or lower respiratory symptoms?: No      Currently a healthcare worker that is involved in direct patient care?: No      Works in a special setting where the risk of COVID-19 transmission may be high? (this may include long-term care, correctional and long term facilities; homeless shelters; assisted-living facilities and group homes ): No      Resident in a special setting where the risk of COVID-19 transmission may be high? (this may include long-term care, correctional and long term facilities; homeless shelters; assisted-living facilities and group homes ): No      Last night positive on a home test    No results found for: [address] [facility], [address] [facility], [address] [facility], [facility], [facility], [facility]  Past Medical History:   Diagnosis Date    Breast lump     last assessed: 13    Diabetes mellitus ([facility] )     Diabetes mellitus type II, controlled ([facility] )     Eustachian tube dysfunction     last assessed: 12/14/15    Hx of tubal ligation     Vagina, candidiasis     last assessed: 13     Past Surgical History:   Procedure Laterality Date     SECTION      x3    TUBAL LIGATION  12    WISDOM TOOTH EXTRACTION       Current Outpatient Medications   Medication Sig Dispense Refill    BD Pen Needle Shellie U/F 32G X 4 MM MISC       BD ULTRA-FINE LANCETS lancets USE TO INJECT INSULIN ONCE DAILY AS DIRECTED (FURTHER REFILLS TO BE GIVEN AT UPCOMING APPOINTMENT)      Continuous Blood Gluc Sensor (FreeStyle Jaxson 2 Sensor) MISC       Empagliflozin 10 MG TABS Take 10 mg by mouth daily      Insulin Glargine-Lixisenatide (SOLIQUA) 100 units-33 mcg/mL injection pen Inject 60 Units under the skin daily      metFORMIN (GLUCOPHAGE) 1000 MG tablet Take 1 tablet (1,000 mg total) by mouth 2 (two) times a day with meals 180 tablet 3    Misc Natural Products (BLOOD SUGAR 360 PO) One touch verio strips Testing BID      rosuvastatin (CRESTOR) 5 mg tablet TAKE 1 TABLET BY MOUTH EVERY DAY 90 tablet 3     No current facility-administered medications for this visit  No Known Allergies    Review of Systems   Constitutional: Positive for fever (tuesday)  Negative for chills and fatigue  HENT: Positive for rhinorrhea and sore throat  Negative for congestion  Respiratory: Positive for cough  Negative for chest tightness and shortness of breath  Gastrointestinal: Negative for abdominal pain, diarrhea, nausea and vomiting  Musculoskeletal: Negative for myalgias  Neurological: Negative for headaches  All other systems reviewed and are negative  Objective: There were no vitals filed for this visit  Physical Exam  Constitutional:       Appearance: Normal appearance  She is not ill-appearing, toxic-appearing or diaphoretic     HENT:      Head: Normocephalic and atraumatic  Eyes:      Conjunctiva/sclera: Conjunctivae normal    Pulmonary:      Effort: Pulmonary effort is normal  No respiratory distress  Skin:     Findings: No rash  Neurological:      General: No focal deficit present  Mental Status: She is alert  Mental status is at baseline  VIRTUAL VISIT DISCLAIMER    Sandra Forrest verbally agrees to participate in [employer]  Pt is aware that [employer] could be limited without vital signs or the ability to perform a full hands-on physical Blair Fuller understands she or the provider may request at any time to terminate the video visit and request the patient to seek care or treatment in person

## 2022-05-06 ENCOUNTER — TELEPHONE (OUTPATIENT)
Dept: FAMILY MEDICINE CLINIC | Facility: CLINIC | Age: 47
End: 2022-05-06

## 2022-05-06 NOTE — TELEPHONE ENCOUNTER
Left message for patient to call on Monday around 7:30-8:00 am with her decision  if she wants to cancel Covid infusion felisa @ 9 am on 5/9/21

## 2022-05-09 ENCOUNTER — HOSPITAL ENCOUNTER (OUTPATIENT)
Dept: INFUSION CENTER | Facility: HOSPITAL | Age: 47
Discharge: HOME | End: 2022-05-09

## 2022-05-09 ENCOUNTER — TELEPHONE (OUTPATIENT)
Dept: INFUSION CENTER | Facility: HOSPITAL | Age: 47
End: 2022-05-09

## 2022-06-30 DIAGNOSIS — Z12.31 ENCOUNTER FOR SCREENING MAMMOGRAM FOR MALIGNANT NEOPLASM OF BREAST: ICD-10-CM

## 2022-07-20 ENCOUNTER — APPOINTMENT (OUTPATIENT)
Dept: LAB | Facility: MEDICAL CENTER | Age: 47
End: 2022-07-20
Payer: COMMERCIAL

## 2022-07-20 DIAGNOSIS — D72.829 LEUKOCYTOSIS, UNSPECIFIED TYPE: Primary | ICD-10-CM

## 2022-07-20 DIAGNOSIS — Z79.4 TYPE 2 DIABETES MELLITUS WITHOUT COMPLICATION, WITH LONG-TERM CURRENT USE OF INSULIN (HCC): ICD-10-CM

## 2022-07-20 DIAGNOSIS — E11.9 TYPE 2 DIABETES MELLITUS WITHOUT COMPLICATION, WITH LONG-TERM CURRENT USE OF INSULIN (HCC): ICD-10-CM

## 2022-07-20 LAB
ALBUMIN SERPL BCP-MCNC: 3.9 G/DL (ref 3.5–5)
ALP SERPL-CCNC: 57 U/L (ref 46–116)
ALT SERPL W P-5'-P-CCNC: 28 U/L (ref 12–78)
ANION GAP SERPL CALCULATED.3IONS-SCNC: 7 MMOL/L (ref 4–13)
AST SERPL W P-5'-P-CCNC: 14 U/L (ref 5–45)
BILIRUB SERPL-MCNC: 0.32 MG/DL (ref 0.2–1)
BUN SERPL-MCNC: 14 MG/DL (ref 5–25)
CALCIUM SERPL-MCNC: 9.2 MG/DL (ref 8.3–10.1)
CHLORIDE SERPL-SCNC: 105 MMOL/L (ref 96–108)
CHOLEST SERPL-MCNC: 122 MG/DL
CO2 SERPL-SCNC: 26 MMOL/L (ref 21–32)
CREAT SERPL-MCNC: 0.59 MG/DL (ref 0.6–1.3)
ERYTHROCYTE [DISTWIDTH] IN BLOOD BY AUTOMATED COUNT: 13.9 % (ref 11.6–15.1)
GFR SERPL CREATININE-BSD FRML MDRD: 109 ML/MIN/1.73SQ M
GLUCOSE P FAST SERPL-MCNC: 148 MG/DL (ref 65–99)
HCT VFR BLD AUTO: 40.5 % (ref 34.8–46.1)
HDLC SERPL-MCNC: 43 MG/DL
HGB BLD-MCNC: 12.7 G/DL (ref 11.5–15.4)
LDLC SERPL CALC-MCNC: 41 MG/DL (ref 0–100)
MCH RBC QN AUTO: 28.8 PG (ref 26.8–34.3)
MCHC RBC AUTO-ENTMCNC: 31.4 G/DL (ref 31.4–37.4)
MCV RBC AUTO: 92 FL (ref 82–98)
PLATELET # BLD AUTO: 390 THOUSANDS/UL (ref 149–390)
PMV BLD AUTO: 11.1 FL (ref 8.9–12.7)
POTASSIUM SERPL-SCNC: 4.7 MMOL/L (ref 3.5–5.3)
PROT SERPL-MCNC: 7.6 G/DL (ref 6.4–8.4)
RBC # BLD AUTO: 4.41 MILLION/UL (ref 3.81–5.12)
SODIUM SERPL-SCNC: 138 MMOL/L (ref 135–147)
TRIGL SERPL-MCNC: 189 MG/DL
WBC # BLD AUTO: 10.54 THOUSAND/UL (ref 4.31–10.16)

## 2022-07-20 PROCEDURE — 80061 LIPID PANEL: CPT

## 2022-07-20 PROCEDURE — 85027 COMPLETE CBC AUTOMATED: CPT

## 2022-07-20 PROCEDURE — 80053 COMPREHEN METABOLIC PANEL: CPT

## 2022-07-27 ENCOUNTER — TELEPHONE (OUTPATIENT)
Dept: HEMATOLOGY ONCOLOGY | Facility: CLINIC | Age: 47
End: 2022-07-27

## 2022-07-28 ENCOUNTER — APPOINTMENT (OUTPATIENT)
Dept: LAB | Facility: CLINIC | Age: 47
End: 2022-07-28
Payer: COMMERCIAL

## 2022-07-28 ENCOUNTER — CONSULT (OUTPATIENT)
Dept: HEMATOLOGY ONCOLOGY | Facility: CLINIC | Age: 47
End: 2022-07-28
Payer: COMMERCIAL

## 2022-07-28 VITALS
BODY MASS INDEX: 36.82 KG/M2 | WEIGHT: 195 LBS | RESPIRATION RATE: 14 BRPM | HEART RATE: 97 BPM | HEIGHT: 61 IN | TEMPERATURE: 99 F | SYSTOLIC BLOOD PRESSURE: 130 MMHG | DIASTOLIC BLOOD PRESSURE: 78 MMHG | OXYGEN SATURATION: 98 %

## 2022-07-28 DIAGNOSIS — D72.829 LEUKOCYTOSIS, UNSPECIFIED TYPE: Primary | ICD-10-CM

## 2022-07-28 DIAGNOSIS — D72.829 LEUKOCYTOSIS, UNSPECIFIED TYPE: ICD-10-CM

## 2022-07-28 DIAGNOSIS — D47.1 MYELOPROLIFERATIVE DISORDER (HCC): ICD-10-CM

## 2022-07-28 DIAGNOSIS — D47.1 MYELOPROLIFERATIVE DISORDER (HCC): Primary | ICD-10-CM

## 2022-07-28 LAB
CRP SERPL QL: 2.3 MG/L
ERYTHROCYTE [DISTWIDTH] IN BLOOD BY AUTOMATED COUNT: 13.5 % (ref 11.6–15.1)
ERYTHROCYTE [SEDIMENTATION RATE] IN BLOOD: 25 MM/HOUR (ref 0–19)
HCT VFR BLD AUTO: 39.7 % (ref 34.8–46.1)
HGB BLD-MCNC: 12.8 G/DL (ref 11.5–15.4)
MCH RBC QN AUTO: 28.8 PG (ref 26.8–34.3)
MCHC RBC AUTO-ENTMCNC: 32.2 G/DL (ref 31.4–37.4)
MCV RBC AUTO: 89 FL (ref 82–98)
NRBC BLD AUTO-RTO: 0 /100 WBCS
PLATELET # BLD AUTO: 328 THOUSANDS/UL (ref 149–390)
PMV BLD AUTO: 10.6 FL (ref 8.9–12.7)
RBC # BLD AUTO: 4.45 MILLION/UL (ref 3.81–5.12)
WBC # BLD AUTO: 11.98 THOUSAND/UL (ref 4.31–10.16)

## 2022-07-28 PROCEDURE — 99204 OFFICE O/P NEW MOD 45 MIN: CPT

## 2022-07-28 PROCEDURE — 36415 COLL VENOUS BLD VENIPUNCTURE: CPT

## 2022-07-28 PROCEDURE — 85027 COMPLETE CBC AUTOMATED: CPT

## 2022-07-28 PROCEDURE — 81207 BCR/ABL1 GENE MINOR BP: CPT

## 2022-07-28 PROCEDURE — 88185 FLOWCYTOMETRY/TC ADD-ON: CPT

## 2022-07-28 PROCEDURE — 81206 BCR/ABL1 GENE MAJOR BP: CPT

## 2022-07-28 PROCEDURE — 81261 IGH GENE REARRANGE AMP METH: CPT

## 2022-07-28 PROCEDURE — 85652 RBC SED RATE AUTOMATED: CPT

## 2022-07-28 PROCEDURE — 88184 FLOWCYTOMETRY/ TC 1 MARKER: CPT

## 2022-07-28 PROCEDURE — 86038 ANTINUCLEAR ANTIBODIES: CPT

## 2022-07-28 PROCEDURE — 86430 RHEUMATOID FACTOR TEST QUAL: CPT

## 2022-07-28 PROCEDURE — 86140 C-REACTIVE PROTEIN: CPT

## 2022-07-28 NOTE — PROGRESS NOTES
[facility] [place] [address] [address] [address] [address]  Hematology Ambulatory Consult  Brandin Estrella, 1975, [id number]  7/28/2022    Assessment/Plan:  1  Leukocytosis, unspecified type  2  Myeloproliferative disorder [facility])  Ms Sarah Lyons is a 58-year-old female seen in consultation for elevated white blood cell count  There is no differential to be reviewed to determine which cell line is causing this elevation  She does not have any significant symptoms at this time and does not have any B symptoms  She does have type 2 diabetes which is pretty well controlled  I explained that this could be contributing to the slight elevation in WBC  She does not have any other diagnosis that could be contributing to this and does not smoke or drink alcohol regularly  Explained in detail the workup listed below including inflammatory markers, PRABHU, RF, leukemia/lymphoma flow cytometry, bcr/ABL, and peripheral smear  She will go for this blood work today after leaving the office  I will call her with the results after her vacation on August 8  She will follow-up with me in the office in 1 month with repeat CBC and peripheral smear at that time  Explained that this is likely her normal which may fluctuate from time to time if she has any flares of her trigger finger or arthritis  Otherwise will discuss the results and any further workup necessary when they are available to me  - CBC and differential  - Peripheral Smear; Future  - BCR/ABL, PCR; Future  - Leukemia/Lymphoma flow cytometry; Future  - RF Screen w/ Reflex to Titer; Future  - Sedimentation rate, automated; Future  - C-reactive protein; Future  - PRABHU w/Reflex; Future  - Ambulatory Referral to Hematology / Oncology  - CBC and differential; Future  - Peripheral Smear; Future      The patient is scheduled for follow-up in approximately 1 month     Patient voiced agreement and understanding to the above  Patient knows to call the Hematology/Oncology office with any questions and concerns regarding the above  Barrier(s) to care: None  The patient is able to self care     -------------------------------------------------------------------------------------------------------    Chief Complaint   Patient presents with    Consult       Referring provider:  Ingris Bryan MD  [id number] [address],  [address] [place] [address]    History of present illness:  Jessica Valentino is a 69-year-old female with past medical history of type 2 diabetes and hyperlipidemia  She is seen in consultation at the request of her PCP for elevated WBCs  Her elevation is mild with most recent white blood cell count of 10 54  There is no differential to be reviewed at this time  Previously she was slightly higher at 12 47 in June of 2021  She denies recent/chronic infections  She has never needed IV antibiotics  She does not have recurrent infections  She denies any rheumatological diagnosis or gastrointestinal problems  She denies frequent diarrhea or GERD  She drinks alcohol occasionally and does not smoke  She denies any recent travel  She denies night sweats, fever, fatigue, early satiety, and weight loss  She denies any arthralgias  She does have a trigger finger in her right thumb that flares from time to time  When her white count was elevated last June she did have a trigger finger flare at that time  She denies any family history of any blood disorders or cancers  She is up-to-date on her cancer screenings including colonoscopy, Pap smear, mammograms        No other historical CBCs to review  06/26/2021:  Hemoglobin 12 8, platelets 614, WBC 12 58, normal differential  12/04/2021:  Hemoglobin 12 8, platelets 729, WBC 59 82  07/20/2022:  Hemoglobin 12 7, platelets 586, WBC 64 56    Review of Systems   Constitutional: Negative for activity change, appetite change, chills, diaphoresis, fatigue, fever and unexpected weight change  HENT: Negative for trouble swallowing and voice change  Eyes: Negative for photophobia and visual disturbance  Respiratory: Negative for cough, chest tightness, shortness of breath and wheezing  Cardiovascular: Negative for chest pain, palpitations and leg swelling  Gastrointestinal: Negative for abdominal distention, abdominal pain, blood in stool, constipation, diarrhea, nausea and vomiting  Endocrine: Negative for cold intolerance and heat intolerance  Genitourinary: Negative for dysuria, hematuria and urgency  Musculoskeletal: Negative for arthralgias, back pain, gait problem, joint swelling and myalgias  Skin: Negative for color change, pallor and rash  Neurological: Negative for dizziness, tremors, weakness, light-headedness, numbness and headaches  Hematological: Negative for adenopathy  Does not bruise/bleed easily  Psychiatric/Behavioral: Negative for confusion and sleep disturbance  The patient is not nervous/anxious          Patient Active Problem List   Diagnosis    Type 2 diabetes mellitus without complication (HCC)    Class 2 severe obesity due to excess calories with serious comorbidity and body mass index (BMI) of 37 0 to 37 9 in adult [facility])    Mixed hyperlipidemia    COVID-19       Past Medical History:   Diagnosis Date    Breast lump     last assessed: 13    Diabetes mellitus ([facility] [address] )     Diabetes mellitus type II, controlled ([facility] [address] )     Eustachian tube dysfunction     last assessed: 12/14/15    Hx of tubal ligation     Vagina, candidiasis     last assessed: 13       Past Surgical History:   Procedure Laterality Date     SECTION      x3    TUBAL LIGATION  12    WISDOM TOOTH EXTRACTION         Family History   Problem Relation Age of Onset    Arthritis Mother     Diabetes Mother     No Known Problems Father     No Known Problems Sister     Diabetes Maternal Grandmother     Heart disease Family     Hypertension Family        Social History     Socioeconomic History    Marital status: /Civil Union     Spouse name: None    Number of children: 3    Years of education: None    Highest education level: None   Occupational History    None   Tobacco Use    Smoking status: Never Smoker    Smokeless tobacco: Never Used   Vaping Use    Vaping Use: Never used   Substance and Sexual Activity    Alcohol use:  Yes     Alcohol/week: 0 0 standard drinks     Comment: Only occasionally or socially    Drug use: Never    Sexual activity: Yes     Partners: Male     Birth control/protection: Female Sterilization   Other Topics Concern    None   Social History Narrative    Coffee    Exercise frequently     Social Determinants of Health     Financial Resource Strain: Not on file   Food Insecurity: Not on file   Transportation Needs: Not on file   Physical Activity: Not on file   Stress: Not on file   Social Connections: Not on file   Intimate Partner Violence: Not on file   Housing Stability: Not on file         Current Outpatient Medications:     BD Pen Needle Shellie U/F 32G X 4 MM MISC, , Disp: , Rfl:     BD ULTRA-FINE LANCETS lancets, USE TO INJECT INSULIN ONCE DAILY AS DIRECTED (FURTHER REFILLS TO BE GIVEN AT UPCOMING APPOINTMENT), Disp: , Rfl:     Continuous Blood Gluc Sensor (FreeStyle Jaxson 2 Sensor) MISC, , Disp: , Rfl:     Insulin Glargine-Lixisenatide (Insulin Glargine-Lixisenatide) 100 units-33 mcg/mL injection pen, Inject 60 Units under the skin daily, Disp: , Rfl:     metFORMIN (GLUCOPHAGE) 1000 MG tablet, Take 1 tablet (1,000 mg total) by mouth 2 (two) times a day with meals, Disp: 180 tablet, Rfl: 3    Misc Natural Products (BLOOD SUGAR 360 PO), One touch verio strips Testing BID, Disp: , Rfl:     rosuvastatin (CRESTOR) 5 mg tablet, TAKE 1 TABLET BY MOUTH EVERY DAY, Disp: 90 tablet, Rfl: 3    Empagliflozin 10 MG TABS, Take 10 mg by mouth daily, Disp: , Rfl:     No Known Allergies    Objective:  /78 (BP Location: Left arm, Patient Position: Sitting, Cuff Size: Large)   Pulse 97   Temp 99 °F (37 2 °C) (Temporal)   Resp 14   Ht 5' 1" (1 549 m)   Wt 88 5 kg (195 lb)   SpO2 98%   BMI 36 84 kg/m²   Physical Exam  Constitutional:       General: She is not in acute distress  Appearance: Normal appearance  She is not ill-appearing  HENT:      Head: Normocephalic and atraumatic  Eyes:      Extraocular Movements: Extraocular movements intact  Conjunctiva/sclera: Conjunctivae normal    Cardiovascular:      Rate and Rhythm: Normal rate and regular rhythm  Pulses: Normal pulses  Pulmonary:      Effort: Pulmonary effort is normal  No respiratory distress  Abdominal:      General: There is no distension  Tenderness: There is no abdominal tenderness  Musculoskeletal:      Cervical back: Normal range of motion  No tenderness  Right lower leg: No edema  Left lower leg: No edema  Skin:     General: Skin is warm and dry  Capillary Refill: Capillary refill takes less than 2 seconds  Coloration: Skin is not pale  Neurological:      General: No focal deficit present  Mental Status: She is alert and oriented to person, place, and time  Mental status is at baseline  Motor: No weakness  Gait: Gait normal    Psychiatric:         Mood and Affect: Mood normal          Behavior: Behavior normal          Thought Content:  Thought content normal          Judgment: Judgment normal          Result Review  Labs:   Appointment on 07/20/2022   Component Date Value Ref Range Status    Cholesterol 07/20/2022 122  See Comment mg/dL Final    Cholesterol:         Pediatric <18 Years        Desirable          <170 mg/dL      Borderline High    170-199 mg/dL      High               >=200 mg/dL        Adult >=18 Years            Desirable         <200 mg/dL      Borderline High   200-239 mg/dL      High              >239 mg/dL      Triglycerides 07/20/2022 189 (A) See Comment mg/dL Final    Triglyceride:     0-9Y            <75mg/dL     10Y-17Y         <90 mg/dL       >=18Y     Normal          <150 mg/dL     Borderline High 150-199 mg/dL     High            200-499 mg/dL        Very High       >499 mg/dL    Specimen collection should occur prior to N-Acetylcysteine or Metamizole administration due to the potential for falsely depressed results   HDL, Direct 07/20/2022 43 (A) >=50 mg/dL Final    LDL Calculated 07/20/2022 41  0 - 100 mg/dL Final    This screening LDL is a calculated result  It does not have the accuracy of the Direct Measured LDL in the monitoring of patients with hyperlipidemia and/or statin therapy  Direct Measure LDL (LSK391) must be ordered separately in these patients  LDL Cholesterol:     Optimal           <100 mg/dl     Near Optimal      100-129 mg/dl     Above Optimal       Borderline High 130-159 mg/dl       High            160-189 mg/dl       Very High       >189 mg/dl           Sodium 07/20/2022 138  135 - 147 mmol/L Final    Potassium 07/20/2022 4 7  3 5 - 5 3 mmol/L Final    Chloride 07/20/2022 105  96 - 108 mmol/L Final    CO2 07/20/2022 26  21 - 32 mmol/L Final    ANION GAP 07/20/2022 7  4 - 13 mmol/L Final    BUN 07/20/2022 14  5 - 25 mg/dL Final    Creatinine 07/20/2022 0 59 (A) 0 60 - 1 30 mg/dL Final    Standardized to IDMS reference method    Glucose, Fasting 07/20/2022 148 (A) 65 - 99 mg/dL Final    Specimen collection should occur prior to Sulfasalazine administration due to the potential for falsely depressed results  Specimen collection should occur prior to Sulfapyridine administration due to the potential for falsely elevated results   Calcium 07/20/2022 9 2  8 3 - 10 1 mg/dL Final    AST 07/20/2022 14  5 - 45 U/L Final    Specimen collection should occur prior to Sulfasalazine administration due to the potential for falsely depressed results       ALT 07/20/2022 28  12 - 78 U/L Final    Specimen collection should occur prior to Sulfasalazine and/or Sulfapyridine administration due to the potential for falsely depressed results   Alkaline Phosphatase 07/20/2022 57  46 - 116 U/L Final    Total Protein 07/20/2022 7 6  6 4 - 8 4 g/dL Final    Albumin 07/20/2022 3 9  3 5 - 5 0 g/dL Final    Total Bilirubin 07/20/2022 0 32  0 20 - 1 00 mg/dL Final    Use of this assay is not recommended for patients undergoing treatment with eltrombopag due to the potential for falsely elevated results   eGFR 07/20/2022 109  ml/min/1 73sq m Final    WBC 07/20/2022 10 54 (A) 4 31 - 10 16 Thousand/uL Final    RBC 07/20/2022 4 41  3 81 - 5 12 Million/uL Final    Hemoglobin 07/20/2022 12 7  11 5 - 15 4 g/dL Final    Hematocrit 07/20/2022 40 5  34 8 - 46 1 % Final    MCV 07/20/2022 92  82 - 98 fL Final    MCH 07/20/2022 28 8  26 8 - 34 3 pg Final    MCHC 07/20/2022 31 4  31 4 - 37 4 g/dL Final    RDW 07/20/2022 13 9  11 6 - 15 1 % Final    Platelets 56/95/0093 390  149 - 390 Thousands/uL Final    MPV 07/20/2022 11 1  8 9 - 12 7 fL Final   Transcribe Orders on 07/20/2022   Component Date Value Ref Range Status    Creatinine, Ur 07/20/2022 64 8  mg/dL Final    Microalbum  ,U,Random 07/20/2022 13 7  0 0 - 20 0 mg/L Final    Microalb Creat Ratio 07/20/2022 21  0 - 30 mg/g creatinine Final    Hemoglobin A1C 07/20/2022 6 7 (A) Normal 3 8-5 6%; PreDiabetic 5 7-6 4%; Diabetic >=6 5%; Glycemic control for adults with diabetes <7 0% % Final    EAG 07/20/2022 146  mg/dl Final           Imaging:  No relevant imaging to be reviewed  Please note: This report has been generated by a voice recognition software system  Therefore there may be syntax, spelling, and/or grammatical errors  Please call if you have any questions

## 2022-07-29 LAB
ANA TITR SER IF: NEGATIVE {TITER}
PATHOLOGIST INTERPRETATION: NORMAL
RHEUMATOID FACT SER QL LA: NEGATIVE

## 2022-08-01 LAB — SCAN RESULT: NORMAL

## 2022-08-02 LAB — SCAN RESULT: NORMAL

## 2022-08-08 ENCOUNTER — TELEPHONE (OUTPATIENT)
Dept: HEMATOLOGY ONCOLOGY | Facility: CLINIC | Age: 47
End: 2022-08-08

## 2022-08-08 LAB — MISCELLANEOUS LAB TEST RESULT: NORMAL

## 2022-08-08 NOTE — TELEPHONE ENCOUNTER
Spoke with Zack Butler regarding her blood work results and no need for any further work up at this time  Will repeat CBC prior to her follow up at the end of the month   She was appreciative of the phone call

## 2022-08-10 ENCOUNTER — TELEPHONE (OUTPATIENT)
Dept: HEMATOLOGY ONCOLOGY | Facility: CLINIC | Age: 47
End: 2022-08-10

## 2022-08-10 NOTE — TELEPHONE ENCOUNTER
Spoke to patient to let her know that her appt with Sis Montesano on 8/30 has been moved to 8:30am  Patient aware and okay with this new time

## 2022-08-27 ENCOUNTER — APPOINTMENT (OUTPATIENT)
Dept: LAB | Facility: MEDICAL CENTER | Age: 47
End: 2022-08-27
Payer: COMMERCIAL

## 2022-08-27 LAB
ERYTHROCYTE [DISTWIDTH] IN BLOOD BY AUTOMATED COUNT: 13.5 % (ref 11.6–15.1)
HCT VFR BLD AUTO: 39.1 % (ref 34.8–46.1)
HGB BLD-MCNC: 12.4 G/DL (ref 11.5–15.4)
MCH RBC QN AUTO: 29 PG (ref 26.8–34.3)
MCHC RBC AUTO-ENTMCNC: 31.7 G/DL (ref 31.4–37.4)
MCV RBC AUTO: 92 FL (ref 82–98)
NRBC BLD AUTO-RTO: 0 /100 WBCS
PLATELET # BLD AUTO: 314 THOUSANDS/UL (ref 149–390)
PMV BLD AUTO: 11 FL (ref 8.9–12.7)
RBC # BLD AUTO: 4.27 MILLION/UL (ref 3.81–5.12)
WBC # BLD AUTO: 9.24 THOUSAND/UL (ref 4.31–10.16)

## 2022-08-27 PROCEDURE — 36415 COLL VENOUS BLD VENIPUNCTURE: CPT

## 2022-08-27 PROCEDURE — 85027 COMPLETE CBC AUTOMATED: CPT

## 2022-08-27 PROCEDURE — 85007 BL SMEAR W/DIFF WBC COUNT: CPT

## 2022-08-29 ENCOUNTER — TELEPHONE (OUTPATIENT)
Dept: HEMATOLOGY ONCOLOGY | Facility: CLINIC | Age: 47
End: 2022-08-29

## 2022-08-29 LAB
BASOPHILS NFR BLD MANUAL: 2 % (ref 0–1)
IMM EOSINOPHIL NFR BLD MANUAL: 7 % (ref 0–6)
LYMPHOCYTES NFR BLD: 31 % (ref 14–44)
MONOCYTES NFR BLD AUTO: 7 % (ref 4–12)
NEUTS BAND NFR BLD MANUAL: 1 THOUSAND/UL
NEUTS SEG NFR BLD AUTO: 50 % (ref 45–77)
PATHOLOGIST INTERPRETATION: NORMAL
PATHOLOGY REVIEW: YES
PLATELET BLD QL SMEAR: ADEQUATE
RBC MORPH BLD: NORMAL
TOTAL CELLS COUNTED SPEC: 100
VARIANT LYMPHS BLD QL SMEAR: 2 % (ref 0–0)

## 2022-08-29 NOTE — TELEPHONE ENCOUNTER
Left message for patient that her appointment with Alfreda for tomorrow   I rescheduled her for the next available and I will send a My Chart message

## 2022-08-29 NOTE — TELEPHONE ENCOUNTER
Appointment Cancellation Or Reschedule     Person calling In Patient    Provider Alba Stone   Office Visit Date and Time 9/1 11Am   Office Visit Location [facility]   Did patient want to reschedule their office appointment? If so, when was it scheduled to? Charlie Martinez, 9/2 8AM   Did you have STAR scheduled for this appointment? no   Do you need STAR set up for your new appointment? If yes, please send to "PATIENT RIDESHARE" pool for STAR rescheduling no   If you are cancelling appointment, can we notify STAR to cancel ride? If yes, please send to "PATIENT RIDESHARE" pool for STAR to cancel service no   Is this patient calling to reschedule an infusion appointment? no   When is their next infusion appointment? no   Is this patient a Chemo patient? no   Reason for Cancellation or Reschedule Lab work     If the patient is a treatment patient, please route this to the office nurse  If the patient is not on treatment, please route to the office MA  If the patient is a surgical oncology patient, please route to surg/onc clinical pool

## 2022-09-02 ENCOUNTER — OFFICE VISIT (OUTPATIENT)
Dept: HEMATOLOGY ONCOLOGY | Facility: CLINIC | Age: 47
End: 2022-09-02
Payer: COMMERCIAL

## 2022-09-02 VITALS
DIASTOLIC BLOOD PRESSURE: 80 MMHG | TEMPERATURE: 98 F | WEIGHT: 193 LBS | HEART RATE: 78 BPM | RESPIRATION RATE: 14 BRPM | SYSTOLIC BLOOD PRESSURE: 130 MMHG | BODY MASS INDEX: 36.44 KG/M2 | HEIGHT: 61 IN | OXYGEN SATURATION: 98 %

## 2022-09-02 DIAGNOSIS — D72.829 LEUKOCYTOSIS, UNSPECIFIED TYPE: Primary | ICD-10-CM

## 2022-09-02 PROCEDURE — 3075F SYST BP GE 130 - 139MM HG: CPT

## 2022-09-02 PROCEDURE — 99213 OFFICE O/P EST LOW 20 MIN: CPT

## 2022-09-02 PROCEDURE — 3079F DIAST BP 80-89 MM HG: CPT

## 2022-09-02 NOTE — PATIENT INSTRUCTIONS
Bloodwork in 2 months, I will call you with the results    Follow up in the office in 6 months with repeat bloodwork at that time

## 2022-09-02 NOTE — PROGRESS NOTES
[address] [facility] [id number]  Hematology Ambulatory Follow-Up  Oswaldo Singh, 1975, [id number]  9/2/2022    Assessment/Plan:  1  Leukocytosis, unspecified type  Ms Alice Will is a 58-year-old female seen in follow-up for leukocytosis  She does not have any significant symptoms at this time and denies any B symptoms  She does have type 2 diabetes which is well controlled with a slightly elevated sed rate which could be caused by a trigger finger and arthritis  All of her other workup today has been negative including leukemia lymphoma flow cytometry, bcr/ABL, PRABHU, RF  Most recently her CBC was completely normal with all cell lines within normal limits and rare atypical lymphocytes noted on peripheral smear  We discussed that I do not feel that it is necessary at this time for any further workup  She will repeat blood work in 2 months and I will call her with these results  She will then repeat blood work in 6 months prior to follow-up with me in the office  I explained that she should call the office with any new B symptoms including fatigue, fevers, night sweats, early satiety, weight loss  - CBC and differential; Future  - Peripheral Smear; Future  - CBC and differential; Future  - Peripheral Smear; Future      The patient is scheduled for follow-up in approximately 6 months  Patient voiced agreement and understanding to the above  Patient knows to call the Hematology/Oncology office with any questions and concerns regarding the above  Barrier(s) to care: None  The patient is able to self care   ------------------------------------------------------------------------------------------------------    Chief Complaint   Patient presents with    Follow-up       History of present illness:   Oswaldo Singh is a 58-year-old female seen in follow-up for leukocytosis    She has had mildly elevated white blood cell counts since June of 2021  She denies recent or chronic infections  She has never needed IV antibiotics  She does not have recurrent infections  She denies any rheumatological diagnosis or gastrointestinal problems  She denies frequent diarrhea or GERD  She drinks alcohol occasionally and does not smoke  She denies any recent travel  She denies any B symptoms  She is up-to-date on her cancer screenings including colonoscopy, Pap smears, mammograms  Initial Work up:  Bcr/ABL: Negative  Leukemia/lymphoma flow cytometry: Negative  RF screen: Negative  Sed rate: 25  CRP: 2 3  PRABHU: Negative  Peripheral smear:  Mild leukocytosis, no left shift, no blasts, rare atypical lymphocytes    No other historical CBCs to review  06/26/2021:  Hemoglobin 12 8, platelets 122, WBC 68 33, normal differential  12/04/2021:  Hemoglobin 12 8, platelets 838, WBC 90 28  07/20/2022:  Hemoglobin 12 7, platelets 540, WBC 02 99  07/28/2022:  Hemoglobin 12 8, platelets 136, WBC 16 84  08/27/2022:  Hemoglobin 12 4, platelets 559, WBC 3 90   Rare atypical lymphocytes    She is doing well and feels good  She has no complaints or concerns today  She has not had any recent infections  She denies any B symptoms  Review of Systems   Constitutional: Negative for activity change, appetite change, chills, diaphoresis, fatigue, fever and unexpected weight change  HENT: Negative for trouble swallowing and voice change  Eyes: Negative for photophobia and visual disturbance  Respiratory: Negative for cough, chest tightness, shortness of breath and wheezing  Cardiovascular: Negative for chest pain, palpitations and leg swelling  Gastrointestinal: Negative for abdominal distention, abdominal pain, blood in stool, constipation, diarrhea, nausea and vomiting  Endocrine: Negative for cold intolerance and heat intolerance  Genitourinary: Negative for dysuria, hematuria and urgency     Musculoskeletal: Negative for arthralgias, back pain, gait problem, joint swelling and myalgias  Skin: Negative for color change, pallor and rash  Neurological: Negative for dizziness, tremors, weakness, light-headedness, numbness and headaches  Hematological: Negative for adenopathy  Does not bruise/bleed easily  Psychiatric/Behavioral: Negative for confusion and sleep disturbance  The patient is not nervous/anxious  Patient Active Problem List   Diagnosis    Type 2 diabetes mellitus without complication (HCC)    Class 2 severe obesity due to excess calories with serious comorbidity and body mass index (BMI) of 37 0 to 37 9 in adult [facility])    Mixed hyperlipidemia    COVID-19       Past Medical History:   Diagnosis Date    Breast lump     last assessed: 13    [facility] mellitus [facility] )     Diabetes mellitus type II, controlled ([facility] )     Eustachian tube dysfunction     last assessed: 12/14/15    Hx of tubal ligation     Vagina, candidiasis     last assessed: 13       Past Surgical History:   Procedure Laterality Date     SECTION      x3    TUBAL LIGATION  12    WISDOM TOOTH EXTRACTION         Family History   Problem Relation Age of Onset    Arthritis Mother     Diabetes Mother     No Known Problems Father     No Known Problems Sister     Diabetes Maternal Grandmother     Heart disease Family     Hypertension Family        Social History     Socioeconomic History    Marital status: /Civil Union     Spouse name: None    Number of children: 3    Years of education: None    Highest education level: None   Occupational History    None   Tobacco Use    Smoking status: Never Smoker    Smokeless tobacco: Never Used   Vaping Use    Vaping Use: Never used   Substance and Sexual Activity    Alcohol use:  Yes     Alcohol/week: 0 0 standard drinks     Comment: Only occasionally or socially    Drug use: Never    Sexual activity: Yes     Partners: Male     Birth control/protection: Female Sterilization   Other Topics Concern    None   Social History Narrative    Coffee    Exercise frequently     Social Determinants of Health     Financial Resource Strain: Not on file   Food Insecurity: Not on file   Transportation Needs: Not on file   Physical Activity: Not on file   Stress: Not on file   Social Connections: Not on file   Intimate Partner Violence: Not on file   Housing Stability: Not on file         Current Outpatient Medications:     BD Pen Needle Shellie U/F 32G X 4 MM MISC, , Disp: , Rfl:     BD ULTRA-FINE LANCETS lancets, USE TO INJECT INSULIN ONCE DAILY AS DIRECTED (FURTHER REFILLS TO BE GIVEN AT UPCOMING APPOINTMENT), Disp: , Rfl:     Continuous Blood Gluc Sensor (FreeStyle Jaxson 2 Sensor) MISC, , Disp: , Rfl:     Insulin Glargine-Lixisenatide (Insulin Glargine-Lixisenatide) 100 units-33 mcg/mL injection pen, Inject 60 Units under the skin daily, Disp: , Rfl:     metFORMIN (GLUCOPHAGE) 1000 MG tablet, Take 1 tablet (1,000 mg total) by mouth 2 (two) times a day with meals, Disp: 180 tablet, Rfl: 3    Misc Natural Products (BLOOD SUGAR 360 PO), One touch verio strips Testing BID, Disp: , Rfl:     rosuvastatin (CRESTOR) 5 mg tablet, TAKE 1 TABLET BY MOUTH EVERY DAY, Disp: 90 tablet, Rfl: 3    Empagliflozin 10 MG TABS, Take 10 mg by mouth daily, Disp: , Rfl:     No Known Allergies    Objective:  /80 (BP Location: Left arm, Patient Position: Sitting, Cuff Size: Adult)   Pulse 78   Temp 98 °F (36 7 °C) (Temporal)   Resp 14   Ht 5' 1" (1 549 m)   Wt 87 5 kg (193 lb)   SpO2 98%   BMI 36 47 kg/m²    Physical Exam  Constitutional:       General: She is not in acute distress  Appearance: Normal appearance  She is not ill-appearing  HENT:      Head: Normocephalic and atraumatic  Eyes:      Extraocular Movements: Extraocular movements intact  Conjunctiva/sclera: Conjunctivae normal    Cardiovascular:      Rate and Rhythm: Normal rate and regular rhythm        Pulses: Normal pulses  Pulmonary:      Effort: Pulmonary effort is normal  No respiratory distress  Abdominal:      General: There is no distension  Tenderness: There is no abdominal tenderness  Musculoskeletal:      Cervical back: Normal range of motion  No tenderness  Right lower leg: No edema  Left lower leg: No edema  Skin:     General: Skin is warm and dry  Capillary Refill: Capillary refill takes less than 2 seconds  Coloration: Skin is not pale  Neurological:      General: No focal deficit present  Mental Status: She is alert and oriented to person, place, and time  Mental status is at baseline  Motor: No weakness  Gait: Gait normal    Psychiatric:         Mood and Affect: Mood normal          Behavior: Behavior normal          Thought Content: Thought content normal          Judgment: Judgment normal          Result Review  Labs:  No visits with results within 1 Month(s) from this visit     Latest known visit with results is:   Appointment on 07/28/2022   Component Date Value Ref Range Status    Scan Result 07/28/2022 SEE WRITTEN REPORT   Final    Scan Result 07/28/2022 SEE WRITTEN REPORT   Final    Rheumatoid Factor 07/28/2022 Negative  Negative Final    Sed Rate 07/28/2022 25 (A) 0 - 19 mm/hour Final    CRP 07/28/2022 2 3  <3 0 mg/L Final    WBC 08/27/2022 9 24  4 31 - 10 16 Thousand/uL Final    RBC 08/27/2022 4 27  3 81 - 5 12 Million/uL Final    Hemoglobin 08/27/2022 12 4  11 5 - 15 4 g/dL Final    Hematocrit 08/27/2022 39 1  34 8 - 46 1 % Final    MCV 08/27/2022 92  82 - 98 fL Final    MCH 08/27/2022 29 0  26 8 - 34 3 pg Final    MCHC 08/27/2022 31 7  31 4 - 37 4 g/dL Final    RDW 08/27/2022 13 5  11 6 - 15 1 % Final    MPV 08/27/2022 11 0  8 9 - 12 7 fL Final    Platelets 64/64/5450 314  149 - 390 Thousands/uL Final    nRBC 08/27/2022 0  /100 WBCs Final    Segmented Neutrophils Manual 08/27/2022 50  45 - 77 % Final    Bands Manual 08/27/2022 1 Thousand/uL Final    Lymphocytes Manual 08/27/2022 31  14 - 44 % Final    Monocytes Manual 08/27/2022 7  4 - 12 % Final    Eosinophils Manual 08/27/2022 7 (A) 0 - 6 % Final    Basos 08/27/2022 2 (A) 0 - 1 % Final    Atypical Lymphocytes Relative 08/27/2022 2 (A) 0 - 0 % Final    Total Counted 08/27/2022 100   Final    RBC Morphology 08/27/2022 Normal   Final    Platelet Estimate 32/14/6223 Adequate  Adequate Final    Pathology Review 08/27/2022 Yes (A) No Final    Antinuclear Antibodies, IFA 07/28/2022 Negative   Final                                         Negative   <1:80                                       Borderline  1:80                                       Positive   >1:80  ICAP nomenclature: AC-0  For more information about Hep-2 cell patterns use  ANApatterns  org, the official website for the International  Consensus on Antinuclear Antibody (PRABHU) Patterns (ICAP)   Path Review 07/28/2022 Agree with differential  Mild leukocytosis  No left shift and no circulating blasts  Rare atypical lymphocyte  Normal RBC morphology  Platelets adequate  *[employer] [place] [facility] [employer]  Final    Miscellaneous Lab Test Result 07/28/2022 SEE WRITTEN REPORT   Final    Path Review 08/27/2022 Rare atypical lymphocytes present  Eduardo Palm MD  Interpretation performed at [facility], [address]     Final       Imaging:   No relevant imaging to review    Please note: This report has been generated by a voice recognition software system  Therefore there may be syntax, spelling, and/or grammatical errors  Please call if you have any questions

## 2022-10-03 DIAGNOSIS — Z79.4 TYPE 2 DIABETES MELLITUS WITHOUT COMPLICATION, WITH LONG-TERM CURRENT USE OF INSULIN (HCC): ICD-10-CM

## 2022-10-03 DIAGNOSIS — E11.9 TYPE 2 DIABETES MELLITUS WITHOUT COMPLICATION, WITH LONG-TERM CURRENT USE OF INSULIN (HCC): ICD-10-CM

## 2022-10-26 ENCOUNTER — OFFICE VISIT (OUTPATIENT)
Dept: FAMILY MEDICINE CLINIC | Facility: CLINIC | Age: 47
End: 2022-10-26
Payer: COMMERCIAL

## 2022-10-26 VITALS
WEIGHT: 194 LBS | RESPIRATION RATE: 18 BRPM | OXYGEN SATURATION: 99 % | SYSTOLIC BLOOD PRESSURE: 132 MMHG | TEMPERATURE: 99.1 F | HEIGHT: 61 IN | BODY MASS INDEX: 36.63 KG/M2 | HEART RATE: 87 BPM | DIASTOLIC BLOOD PRESSURE: 74 MMHG

## 2022-10-26 DIAGNOSIS — Z11.59 ENCOUNTER FOR HEPATITIS C SCREENING TEST FOR LOW RISK PATIENT: ICD-10-CM

## 2022-10-26 DIAGNOSIS — Z23 IMMUNIZATION DUE: ICD-10-CM

## 2022-10-26 DIAGNOSIS — E11.9 TYPE 2 DIABETES MELLITUS WITHOUT COMPLICATION, WITH LONG-TERM CURRENT USE OF INSULIN (HCC): Primary | ICD-10-CM

## 2022-10-26 DIAGNOSIS — Z79.4 TYPE 2 DIABETES MELLITUS WITHOUT COMPLICATION, WITH LONG-TERM CURRENT USE OF INSULIN (HCC): Primary | ICD-10-CM

## 2022-10-26 PROCEDURE — 90715 TDAP VACCINE 7 YRS/> IM: CPT | Performed by: FAMILY MEDICINE

## 2022-10-26 PROCEDURE — 99214 OFFICE O/P EST MOD 30 MIN: CPT | Performed by: FAMILY MEDICINE

## 2022-10-26 PROCEDURE — 90471 IMMUNIZATION ADMIN: CPT | Performed by: FAMILY MEDICINE

## 2022-10-26 NOTE — ASSESSMENT & PLAN NOTE
Lab Results   Component Value Date    HGBA1C 6 7 (H) 07/20/2022   Controlled  Foot exam done today  Continue medication as prescribed  F/u 6 months for physical

## 2022-10-26 NOTE — PROGRESS NOTES
Name: Jayda Foster      : 1975      MRN: [id number]  Encounter Provider: Simone Rodriguez MD  Encounter Date: 10/26/2022   Encounter department: [address] [place] [address]     1  Type 2 diabetes mellitus without complication, with long-term current use of insulin [facility])  Assessment & Plan:    Lab Results   Component Value Date    HGBA1C 6 7 (H) 2022   Controlled  Foot exam done today  Continue medication as prescribed  F/u 6 months for physical     Orders:  -     Lipid Panel with Direct LDL reflex; Future  -     Hemoglobin A1C; Future  -     Comprehensive metabolic panel; Future  -     CBC; Future; Expected date: 10/26/2022  -     TSH, 3rd generation with Free T4 reflex; Future; Expected date: 10/26/2022    2  Immunization due  -     TDAP VACCINE GREATER THAN OR EQUAL TO 6YO IM    3  Encounter for hepatitis C screening test for low risk patient  -     Hepatitis C antibody; Future        Depression Screening and Follow-up Plan: Patient was screened for depression during today's encounter  They screened negative with a PHQ-2 score of 0  Subjective      Diabetes  She presents for her follow-up diabetic visit  She has type 2 diabetes mellitus  Her disease course has been stable  There are no hypoglycemic associated symptoms  Pertinent negatives for hypoglycemia include no dizziness or headaches  There are no diabetic associated symptoms  Pertinent negatives for diabetes include no chest pain and no weakness  Symptoms are stable  Review of Systems   Constitutional: Negative for activity change, chills and fever  HENT: Negative for congestion, rhinorrhea and sore throat  Eyes: Negative for visual disturbance  Respiratory: Negative for cough, shortness of breath and wheezing  Cardiovascular: Negative for chest pain and palpitations  Gastrointestinal: Negative for abdominal pain, blood in stool, constipation, diarrhea, nausea and vomiting     Genitourinary: Negative for dysuria  Musculoskeletal: Negative for arthralgias and myalgias  Skin: Negative for rash  Neurological: Negative for dizziness, weakness and headaches  All other systems reviewed and are negative  Current Outpatient Medications on File Prior to Visit   Medication Sig   • BD Pen Needle Shellie U/F 32G X 4 MM MISC    • BD ULTRA-FINE LANCETS lancets USE TO INJECT INSULIN ONCE DAILY AS DIRECTED (FURTHER REFILLS TO BE GIVEN AT UPCOMING APPOINTMENT)   • Continuous Blood Gluc Sensor (FreeStyle Jaxson 2 Sensor) MISC    • Insulin Glargine-Lixisenatide (Insulin Glargine-Lixisenatide) 100 units-33 mcg/mL injection pen Inject 60 Units under the skin daily   • metFORMIN (GLUCOPHAGE) 1000 MG tablet TAKE 1 TABLET TWICE A DAY WITH MEALS   • Misc Natural Products (BLOOD SUGAR 360 PO) One touch verio strips Testing BID   • rosuvastatin (CRESTOR) 5 mg tablet TAKE 1 TABLET BY MOUTH EVERY DAY   • [DISCONTINUED] Empagliflozin 10 MG TABS Take 10 mg by mouth daily       Objective     /74   Pulse 87   Temp 99 1 °F (37 3 °C)   Resp 18   Ht 5' 1" (1 549 m)   Wt 88 kg (194 lb)   LMP 10/23/2022 (Exact Date)   SpO2 99%   Breastfeeding No   BMI 36 66 kg/m²     Physical Exam  Vitals and nursing note reviewed  Constitutional:       General: She is not in acute distress  Appearance: Normal appearance  She is well-developed  She is not ill-appearing or diaphoretic  HENT:      Head: Normocephalic and atraumatic  Right Ear: External ear normal       Left Ear: External ear normal       Nose: Nose normal    Eyes:      General: Lids are normal       Conjunctiva/sclera: Conjunctivae normal    Neck:      Vascular: No JVD  Trachea: No tracheal deviation  Cardiovascular:      Pulses: no weak pulses          Dorsalis pedis pulses are 2+ on the right side and 2+ on the left side  Posterior tibial pulses are 2+ on the right side and 2+ on the left side     Pulmonary:      Effort: No accessory muscle usage or respiratory distress  Feet:      Right foot:      Skin integrity: No ulcer, skin breakdown, erythema, warmth, callus or dry skin  Left foot:      Skin integrity: No ulcer, skin breakdown, erythema, warmth, callus or dry skin  Skin:     Findings: No rash  Neurological:      Mental Status: She is alert  Diabetic Foot Exam    Patient's shoes and socks removed  Right Foot/Ankle   Right Foot Inspection  Skin Exam: skin normal and skin intact  No dry skin, no warmth, no callus, no erythema, no maceration, no abnormal color, no pre-ulcer, no ulcer and no callus  Toe Exam: ROM and strength within normal limits  Sensory   Vibration: intact  Monofilament testing: intact    Vascular  Capillary refills: < 3 seconds  The right DP pulse is 2+  The right PT pulse is 2+  Left Foot/Ankle  Left Foot Inspection  Skin Exam: skin normal and skin intact  No dry skin, no warmth, no erythema, no maceration, normal color, no pre-ulcer, no ulcer and no callus  Toe Exam: ROM and strength within normal limits  Sensory   Vibration: intact  Monofilament testing: intact    Vascular  Capillary refills: < 3 seconds  The left DP pulse is 2+  The left PT pulse is 2+       Assign Risk Category  No deformity present  No loss of protective sensation  No weak pulses  Risk: 0    Lizzie Rosario MD

## 2022-11-02 LAB
LEFT EYE DIABETIC RETINOPATHY: NORMAL
RIGHT EYE DIABETIC RETINOPATHY: NORMAL

## 2022-11-02 PROCEDURE — 2023F DILAT RTA XM W/O RTNOPTHY: CPT | Performed by: FAMILY MEDICINE

## 2022-12-13 ENCOUNTER — TELEPHONE (OUTPATIENT)
Dept: HEMATOLOGY ONCOLOGY | Facility: CLINIC | Age: 47
End: 2022-12-13

## 2022-12-13 NOTE — TELEPHONE ENCOUNTER
Patient states she received a denial for payment for labs ordered by Parvin Sy, [address] 7/28/2022  Deemed not medically necessary  Patient initiated an appeal and was advised by her insurance company that a form would be sent to the office requested medical records  No such form found in the RightFax folders, or in the media portion of the patient's chart  Insurance customer service number to inquire  [phone]      Insurance OX#[id number]

## 2023-01-03 NOTE — PROGRESS NOTES
Assessment   49 y o  [id number] presenting for annual exam      Plan   Diagnoses and all orders for this visit:    Well woman exam with routine gynecological exam  -     Liquid-based pap, diagnostic    Screening mammogram for breast cancer  -     Mammo screening bilateral w 3d & cad; Future        Pap collected today  Mammo slip given    Colonoscopy - up to date    SBE encouraged, A yearly mammogram is recommended for breast cancer screening starting at age 36  ASCCP guidelines reviewed  Advised to call with any issues, all concerns & questions addressed  See provided information in your after visit summary     F/U Annually and PRN    Results will be released to [employer], if abnormal will call or message to review and discuss treatment plan      __________________________________________________________________    Subjective     Blas Scales is a 49 y o  [id number] presenting for annual exam  She is without complaints    SCREENING  Last Pap: 8/2020 neg/neg  Last Mammo: 06/23/2022 birads 2  Last Colonoscopy: 11/19/2020; 5 year recall      GYN  Periods are regular every 30-35 days, lasting 4 days  Sexually active: Yes - single partner - male  Contraception: BTL    Hx Abnormal pap: denies  We reviewed ASCCP guidelines for Pap testing today  Denies vaginal discharge, itching, odor, dyspareunia, pelvic pain and vulvar/vaginal symptoms      OB  [id number]         Complaints: denies   Denies urgency, frequency, hematuria, leakage / change in stream, difficulty urinating  BREAST  Complaints: denies   Denies: breast lump, breast tenderness, nipple discharge, skin color change, and skin lesion(s)      Pertinent Family Hx:   Family hx of breast cancer: no  Family hx of ovarian cancer: no  Family hx of colon cancer: PGF      GENERAL  PMH reviewed/updated and is as below  Patient does follow with a PCP      SOCIAL  Smoking: no  Alcohol:social  Drug: no  Occupation:  for       Past Medical History: Diagnosis Date   • Breast lump     last assessed: 13   • Diabetes mellitus ([facility] [address] )    • Diabetes mellitus type II, controlled ([facility] )    • Eustachian tube dysfunction     last assessed: 12/14/15   • Hx of tubal ligation    • Vagina, candidiasis     last assessed: 13       Past Surgical History:   Procedure Laterality Date   •  SECTION      x3   • TUBAL LIGATION  12   • WISDOM TOOTH EXTRACTION           Current Outpatient Medications:   •  BD Pen Needle Shellie U/F 32G X 4 MM MISC, , Disp: , Rfl:   •  BD ULTRA-FINE LANCETS lancets, USE TO INJECT INSULIN ONCE DAILY AS DIRECTED (FURTHER REFILLS TO BE GIVEN AT UPCOMING APPOINTMENT), Disp: , Rfl:   •  Continuous Blood Gluc Sensor (FreeStyle Jaxson 2 Sensor) MISC, , Disp: , Rfl:   •  Insulin Glargine-Lixisenatide (Insulin Glargine-Lixisenatide) 100 units-33 mcg/mL injection pen, Inject 60 Units under the skin daily, Disp: , Rfl:   •  metFORMIN (GLUCOPHAGE) 1000 MG tablet, TAKE 1 TABLET TWICE A DAY WITH MEALS, Disp: 180 tablet, Rfl: 3  •  Misc Natural Products (BLOOD SUGAR 360 PO), One touch verio strips Testing BID, Disp: , Rfl:   •  rosuvastatin (CRESTOR) 5 mg tablet, TAKE 1 TABLET BY MOUTH EVERY DAY, Disp: 90 tablet, Rfl: 3    No Known Allergies    Social History     Socioeconomic History   • Marital status: /Civil Union     Spouse name: Not on file   • Number of children: 3   • Years of education: Not on file   • Highest education level: Not on file   Occupational History   • Not on file   Tobacco Use   • Smoking status: Never   • Smokeless tobacco: Never   Vaping Use   • Vaping Use: Never used   Substance and Sexual Activity   • Alcohol use:  Yes     Alcohol/week: 0 0 standard drinks     Comment: Only occasionally or socially   • Drug use: Never   • Sexual activity: Yes     Partners: Male     Birth control/protection: Female Sterilization   Other Topics Concern   • Not on file   Social History Narrative    Coffee    Exercise frequently Social Determinants of Health     Financial Resource Strain: Not on file   Food Insecurity: Not on file   Transportation Needs: Not on file   Physical Activity: Not on file   Stress: Not on file   Social Connections: Not on file   Intimate Partner Violence: Not on file   Housing Stability: Not on file       Review of Systems     ROS:  Constitutional: Negative for fatigue and unexpected weight change  Respiratory: Negative for cough and shortness of breath  Cardiovascular: Negative for chest pain and palpitations  Gastrointestinal: Negative for abdominal pain and change in bowel habits  Breasts:  Negative, other than as noted above  Genitourinary: Negative, other than as noted above  Psychiatric: Negative for mood difficulties  Objective         Vitals:    01/05/23 0825   BP: 126/84         Physical Examination:    Patient appears well and is not in distress  Neck is supple without masses, no cervical or supraclavicular lymphadenopathy  Cardiovascular: regular rate and rhythm; no murmurs  Lungs: clear to auscultation bilaterally; no wheezes  Breasts are symmetrical without mass, tenderness, nipple discharge, skin changes or adenopathy  Abdomen is soft and nontender without masses  External genitals are normal without lesions or rashes  Urethral meatus and urethra are normal  Bladder is normal to palpation  Vagina is normal without discharge or bleeding  Cervix is normal without discharge or lesion  Uterus is normal, mobile, nontender without palpable mass  Adnexa are normal, nontender, without palpable mass

## 2023-01-05 ENCOUNTER — ANNUAL EXAM (OUTPATIENT)
Dept: OBGYN CLINIC | Facility: MEDICAL CENTER | Age: 48
End: 2023-01-05

## 2023-01-05 VITALS
DIASTOLIC BLOOD PRESSURE: 84 MMHG | HEIGHT: 61 IN | BODY MASS INDEX: 37 KG/M2 | SYSTOLIC BLOOD PRESSURE: 126 MMHG | WEIGHT: 196 LBS

## 2023-01-05 DIAGNOSIS — Z01.419 WELL WOMAN EXAM WITH ROUTINE GYNECOLOGICAL EXAM: Primary | ICD-10-CM

## 2023-01-05 DIAGNOSIS — Z12.31 SCREENING MAMMOGRAM FOR BREAST CANCER: ICD-10-CM

## 2023-01-09 LAB
HPV HR 12 DNA CVX QL NAA+PROBE: NEGATIVE
HPV16 DNA CVX QL NAA+PROBE: NEGATIVE
HPV18 DNA CVX QL NAA+PROBE: NEGATIVE

## 2023-01-12 LAB
LAB AP GYN PRIMARY INTERPRETATION: NORMAL
Lab: NORMAL

## 2023-01-21 ENCOUNTER — APPOINTMENT (OUTPATIENT)
Dept: LAB | Facility: MEDICAL CENTER | Age: 48
End: 2023-01-21

## 2023-01-21 DIAGNOSIS — Z79.4 TYPE 2 DIABETES MELLITUS WITHOUT COMPLICATION, WITH LONG-TERM CURRENT USE OF INSULIN (HCC): ICD-10-CM

## 2023-01-21 DIAGNOSIS — E78.2 MIXED HYPERLIPIDEMIA: ICD-10-CM

## 2023-01-21 DIAGNOSIS — Z11.59 ENCOUNTER FOR HEPATITIS C SCREENING TEST FOR LOW RISK PATIENT: ICD-10-CM

## 2023-01-21 DIAGNOSIS — E11.9 TYPE 2 DIABETES MELLITUS WITHOUT COMPLICATION, WITH LONG-TERM CURRENT USE OF INSULIN (HCC): ICD-10-CM

## 2023-01-21 LAB
ALBUMIN SERPL BCP-MCNC: 4.2 G/DL (ref 3.5–5)
ALP SERPL-CCNC: 59 U/L (ref 46–116)
ALT SERPL W P-5'-P-CCNC: 32 U/L (ref 12–78)
ANION GAP SERPL CALCULATED.3IONS-SCNC: 8 MMOL/L (ref 4–13)
AST SERPL W P-5'-P-CCNC: 20 U/L (ref 5–45)
BILIRUB SERPL-MCNC: 0.59 MG/DL (ref 0.2–1)
BUN SERPL-MCNC: 16 MG/DL (ref 5–25)
CALCIUM SERPL-MCNC: 9.4 MG/DL (ref 8.3–10.1)
CHLORIDE SERPL-SCNC: 105 MMOL/L (ref 96–108)
CHOLEST SERPL-MCNC: 107 MG/DL
CO2 SERPL-SCNC: 24 MMOL/L (ref 21–32)
CREAT SERPL-MCNC: 0.66 MG/DL (ref 0.6–1.3)
ERYTHROCYTE [DISTWIDTH] IN BLOOD BY AUTOMATED COUNT: 13.2 % (ref 11.6–15.1)
EST. AVERAGE GLUCOSE BLD GHB EST-MCNC: 146 MG/DL
GFR SERPL CREATININE-BSD FRML MDRD: 104 ML/MIN/1.73SQ M
GLUCOSE P FAST SERPL-MCNC: 154 MG/DL (ref 65–99)
HBA1C MFR BLD: 6.7 %
HCT VFR BLD AUTO: 40.9 % (ref 34.8–46.1)
HCV AB SER QL: NORMAL
HDLC SERPL-MCNC: 44 MG/DL
HGB BLD-MCNC: 13.2 G/DL (ref 11.5–15.4)
LDLC SERPL CALC-MCNC: 44 MG/DL (ref 0–100)
MCH RBC QN AUTO: 28.9 PG (ref 26.8–34.3)
MCHC RBC AUTO-ENTMCNC: 32.3 G/DL (ref 31.4–37.4)
MCV RBC AUTO: 90 FL (ref 82–98)
PLATELET # BLD AUTO: 357 THOUSANDS/UL (ref 149–390)
PMV BLD AUTO: 10.9 FL (ref 8.9–12.7)
POTASSIUM SERPL-SCNC: 4.4 MMOL/L (ref 3.5–5.3)
PROT SERPL-MCNC: 8.3 G/DL (ref 6.4–8.4)
RBC # BLD AUTO: 4.56 MILLION/UL (ref 3.81–5.12)
SODIUM SERPL-SCNC: 137 MMOL/L (ref 135–147)
TRIGL SERPL-MCNC: 94 MG/DL
TSH SERPL DL<=0.05 MIU/L-ACNC: 1.19 UIU/ML (ref 0.45–4.5)
WBC # BLD AUTO: 10.68 THOUSAND/UL (ref 4.31–10.16)

## 2023-02-27 ENCOUNTER — APPOINTMENT (OUTPATIENT)
Dept: LAB | Facility: MEDICAL CENTER | Age: 48
End: 2023-02-27

## 2023-02-27 DIAGNOSIS — D72.829 LEUKOCYTOSIS, UNSPECIFIED TYPE: ICD-10-CM

## 2023-02-27 LAB
BASOPHILS NFR BLD MANUAL: 1 % (ref 0–1)
ERYTHROCYTE [DISTWIDTH] IN BLOOD BY AUTOMATED COUNT: 13.4 % (ref 11.6–15.1)
GIANT PLATELETS BLD QL SMEAR: PRESENT
HCT VFR BLD AUTO: 39.4 % (ref 34.8–46.1)
HGB BLD-MCNC: 12.6 G/DL (ref 11.5–15.4)
IMM EOSINOPHIL NFR BLD MANUAL: 5 % (ref 0–6)
LYMPHOCYTES NFR BLD: 30 % (ref 14–44)
MCH RBC QN AUTO: 29.1 PG (ref 26.8–34.3)
MCHC RBC AUTO-ENTMCNC: 32 G/DL (ref 31.4–37.4)
MCV RBC AUTO: 91 FL (ref 82–98)
MONOCYTES NFR BLD AUTO: 6 % (ref 4–12)
NEUTS SEG NFR BLD AUTO: 50 % (ref 45–77)
NRBC BLD AUTO-RTO: 0 /100 WBCS
PLATELET # BLD AUTO: 378 THOUSANDS/UL (ref 149–390)
PLATELET BLD QL SMEAR: ADEQUATE
PMV BLD AUTO: 11.3 FL (ref 8.9–12.7)
RBC # BLD AUTO: 4.33 MILLION/UL (ref 3.81–5.12)
RBC MORPH BLD: NORMAL
TOTAL CELLS COUNTED SPEC: 100
VARIANT LYMPHS BLD QL SMEAR: 8 % (ref 0–0)
WBC # BLD AUTO: 10.39 THOUSAND/UL (ref 4.31–10.16)

## 2023-03-02 ENCOUNTER — OFFICE VISIT (OUTPATIENT)
Dept: HEMATOLOGY ONCOLOGY | Facility: CLINIC | Age: 48
End: 2023-03-02

## 2023-03-02 ENCOUNTER — DOCUMENTATION (OUTPATIENT)
Dept: HEMATOLOGY ONCOLOGY | Facility: CLINIC | Age: 48
End: 2023-03-02

## 2023-03-02 VITALS
OXYGEN SATURATION: 98 % | HEIGHT: 61 IN | RESPIRATION RATE: 14 BRPM | BODY MASS INDEX: 36.63 KG/M2 | HEART RATE: 78 BPM | TEMPERATURE: 98 F | SYSTOLIC BLOOD PRESSURE: 124 MMHG | WEIGHT: 194 LBS | DIASTOLIC BLOOD PRESSURE: 76 MMHG

## 2023-03-02 DIAGNOSIS — D72.829 LEUKOCYTOSIS, UNSPECIFIED TYPE: Primary | ICD-10-CM

## 2023-03-02 DIAGNOSIS — D72.89 ATYPICAL LYMPHOCYTES PRESENT ON PERIPHERAL BLOOD SMEAR: ICD-10-CM

## 2023-03-02 NOTE — PROGRESS NOTES
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  Ms Suresh Crandall is a 48-year-old female seen in follow-up for leukocytosis  She does not have significant symptoms associated with this and denies B symptoms  Her work-up was negative including leukemia lymphoma flow cytometry, BCR/ABL, autoimmune work-up  She has had off-and-on atypical lymphocytes noted on peripheral smear  Most recently her atypical lymphocytes were 8% this is after a recent upper respiratory infection with a lingering cough  She will continue to have labs drawn every 3 months prior to follow-up with me in the office in 6 months  She knows to call the office with any new symptoms and we can repeat blood work sooner  We discussed that if her atypical lymphocytes continue to rise despite any new illnesses a bone marrow biopsy would be recommended at that time  - CBC and differential; Future  - Peripheral Smear; Future  - CBC and differential; Future  - Peripheral Smear; Future      The patient is scheduled for follow-up in approximately 6 months  Patient voiced agreement and understanding to the above  Patient knows to call the Hematology/Oncology office with any questions and concerns regarding the above  Barrier(s) to care: None  The patient is able to self care   ------------------------------------------------------------------------------------------------------    Chief Complaint   Patient presents with   • Follow-up       History of present illness:   Prema Garcia is a 48-year-old female seen in follow-up for leukocytosis  She has had mildly elevated white blood cell counts since June of 2021  She denies recent or chronic infections    She has never needed IV antibiotics  She does not have recurrent infections  She denies any rheumatological diagnosis or gastrointestinal problems  She denies frequent diarrhea or GERD  She drinks alcohol occasionally and does not smoke  She denies any recent travel  She denies any B symptoms  She is up-to-date on her cancer screenings including colonoscopy, Pap smears, mammograms      Initial Work up:  Bcr/ABL: Negative  Leukemia/lymphoma flow cytometry: Negative  RF screen: Negative  Sed rate: 25  CRP: 2 3  PRABHU: Negative  Peripheral smear:  Mild leukocytosis, no left shift, no blasts, rare atypical lymphocytes     No other historical CBCs to review  06/26/2021:  Hemoglobin 12 8, platelets 608, WBC 07 22, normal differential  12/04/2021:  Hemoglobin 12 8, platelets 263, WBC 51 41  07/20/2022:  Hemoglobin 12 7, platelets 212, WBC 47 17  07/28/2022:  Hemoglobin 12 8, platelets 241, WBC 38 15  08/27/2022:  Hemoglobin 12 4, platelets 030, WBC 0 48              Rare atypical lymphocytes  2/27/2023: Hemoglobin 12 6, platelets 022, WBC 69 37   Atypical lymphocytes 8%    Interval history: About 2 weeks ago her entire family was sick with a upper respiratory infection  She still has a lingering cough  They all tested negative for COVID  Otherwise she has been feeling well and doing good  Review of Systems   Constitutional: Negative for activity change, appetite change, chills, diaphoresis, fatigue, fever and unexpected weight change  HENT: Negative for trouble swallowing and voice change  Eyes: Negative for photophobia and visual disturbance  Respiratory: Negative for cough, chest tightness, shortness of breath and wheezing  Cardiovascular: Negative for chest pain, palpitations and leg swelling  Gastrointestinal: Negative for abdominal distention, abdominal pain, blood in stool, constipation, diarrhea, nausea and vomiting  Endocrine: Negative for cold intolerance and heat intolerance     Genitourinary: Negative for dysuria, hematuria and urgency  Musculoskeletal: Negative for arthralgias, back pain, gait problem, joint swelling and myalgias  Skin: Negative for color change, pallor and rash  Neurological: Negative for dizziness, tremors, weakness, light-headedness, numbness and headaches  Hematological: Negative for adenopathy  Does not bruise/bleed easily  Psychiatric/Behavioral: Negative for confusion and sleep disturbance  The patient is not nervous/anxious  Patient Active Problem List   Diagnosis   • Type 2 diabetes mellitus without complication (HCC)   • Class 2 severe obesity due to excess calories with serious comorbidity and body mass index (BMI) of 37 0 to 37 9 in adult [facility])   • Mixed hyperlipidemia   • COVID-19       Past Medical History:   Diagnosis Date   • Breast lump     last assessed: 13   • Diabetes mellitus ([facility] )    • Diabetes mellitus type II, controlled ([facility] )    • Eustachian tube dysfunction     last assessed: 12/14/15   • Hx of tubal ligation    • Vagina, candidiasis     last assessed: 13       Past Surgical History:   Procedure Laterality Date   •  SECTION      x3   • TUBAL LIGATION  12   • WISDOM TOOTH EXTRACTION         Family History   Problem Relation Age of Onset   • Arthritis Mother    • Diabetes Mother    • No Known Problems Father    • No Known Problems Sister    • Diabetes Maternal Grandmother    • Colon cancer Paternal Grandfather    • Heart disease Family    • Hypertension Family        Social History     Socioeconomic History   • Marital status: /Civil Union     Spouse name: None   • Number of children: 3   • Years of education: None   • Highest education level: None   Occupational History   • None   Tobacco Use   • Smoking status: Never   • Smokeless tobacco: Never   Vaping Use   • Vaping Use: Never used   Substance and Sexual Activity   • Alcohol use:  Yes     Alcohol/week: 0 0 standard drinks     Comment: Only occasionally or socially   • Drug use: Never   • Sexual activity: Yes     Partners: Male     Birth control/protection: Female Sterilization   Other Topics Concern   • None   Social History Narrative    Coffee    Exercise frequently     Social Determinants of Health     Financial Resource Strain: Not on file   Food Insecurity: Not on file   Transportation Needs: Not on file   Physical Activity: Not on file   Stress: Not on file   Social Connections: Not on file   Intimate Partner Violence: Not on file   Housing Stability: Not on file         Current Outpatient Medications:   •  BD Pen Needle Shellie U/F 32G X 4 MM MISC, , Disp: , Rfl:   •  BD ULTRA-FINE LANCETS lancets, USE TO INJECT INSULIN ONCE DAILY AS DIRECTED (FURTHER REFILLS TO BE GIVEN AT UPCOMING APPOINTMENT), Disp: , Rfl:   •  Continuous Blood Gluc Sensor (FreeStyle Jaxson 2 Sensor) MISC, , Disp: , Rfl:   •  Insulin Glargine-Lixisenatide (Insulin Glargine-Lixisenatide) 100 units-33 mcg/mL injection pen, Inject 60 Units under the skin daily, Disp: , Rfl:   •  metFORMIN (GLUCOPHAGE) 1000 MG tablet, TAKE 1 TABLET TWICE A DAY WITH MEALS, Disp: 180 tablet, Rfl: 3  •  Misc Natural Products (BLOOD SUGAR 360 PO), One touch verio strips Testing BID, Disp: , Rfl:   •  rosuvastatin (CRESTOR) 5 mg tablet, TAKE 1 TABLET BY MOUTH EVERY DAY, Disp: 90 tablet, Rfl: 3    No Known Allergies    Objective:  /76 (BP Location: Left arm, Patient Position: Sitting, Cuff Size: Large)   Pulse 78   Temp 98 °F (36 7 °C) (Temporal)   Resp 14   Ht 5' 1" (1 549 m)   Wt 88 kg (194 lb)   SpO2 98%   BMI 36 66 kg/m²    Physical Exam  Constitutional:       General: She is not in acute distress  Appearance: Normal appearance  She is not ill-appearing  HENT:      Head: Normocephalic and atraumatic  Eyes:      Extraocular Movements: Extraocular movements intact  Conjunctiva/sclera: Conjunctivae normal    Cardiovascular:      Rate and Rhythm: Normal rate and regular rhythm  Pulses: Normal pulses  Pulmonary:      Effort: Pulmonary effort is normal  No respiratory distress  Abdominal:      General: There is no distension  Tenderness: There is no abdominal tenderness  Musculoskeletal:      Cervical back: Normal range of motion  No tenderness  Right lower leg: No edema  Left lower leg: No edema  Skin:     General: Skin is warm and dry  Capillary Refill: Capillary refill takes less than 2 seconds  Coloration: Skin is not pale  Neurological:      General: No focal deficit present  Mental Status: She is alert and oriented to person, place, and time  Mental status is at baseline  Motor: No weakness  Gait: Gait normal    Psychiatric:         Mood and Affect: Mood normal          Behavior: Behavior normal          Thought Content:  Thought content normal          Judgment: Judgment normal          Result Review  Labs:  Appointment on 02/27/2023   Component Date Value Ref Range Status   • WBC 02/27/2023 10 39 (H)  4 31 - 10 16 Thousand/uL Final   • RBC 02/27/2023 4 33  3 81 - 5 12 Million/uL Final   • Hemoglobin 02/27/2023 12 6  11 5 - 15 4 g/dL Final   • Hematocrit 02/27/2023 39 4  34 8 - 46 1 % Final   • MCV 02/27/2023 91  82 - 98 fL Final   • MCH 02/27/2023 29 1  26 8 - 34 3 pg Final   • MCHC 02/27/2023 32 0  31 4 - 37 4 g/dL Final   • RDW 02/27/2023 13 4  11 6 - 15 1 % Final   • MPV 02/27/2023 11 3  8 9 - 12 7 fL Final   • Platelets 32/85/9053 378  149 - 390 Thousands/uL Final   • nRBC 02/27/2023 0  /100 WBCs Final   • Segmented Neutrophils Manual 02/27/2023 50  45 - 77 % Final   • Lymphocytes Manual 02/27/2023 30  14 - 44 % Final   • Monocytes Manual 02/27/2023 6  4 - 12 % Final   • Eosinophils Manual 02/27/2023 5  0 - 6 % Final   • Basos 02/27/2023 1  0 - 1 % Final   • Atypical Lymphocytes Relative 02/27/2023 8 (H)  0 - 0 % Final   • Total Counted 02/27/2023 100   Final   • RBC Morphology 02/27/2023 Normal   Final   • Platelet Estimate 52/91/1102 Adequate  Adequate Final   • Giant PLTs 02/27/2023 Present   Final       Imaging:   No relevant imaging to review     Please note: This report has been generated by a voice recognition software system  Therefore there may be syntax, spelling, and/or grammatical errors  Please call if you have any questions

## 2023-03-02 NOTE — PROGRESS NOTES
Oncology Finance Advocacy Intake and Intervention  Oncology Finance Counselor/Advocate placed call to patient  This writer informed patient that this writer is here to assist patient with billing questions, financial assistance, payment/payment plans, quotes, copayment assistance, insurance optimization, and insurance navigation  This writer conducted a thorough benefit review of copayment, deductible, and out of pocket cost  This information is documented below and has been reviewed with patient  Copayment: N/A  Deductible: $200 MET  REMAINING $0  Out of Pocket Cost: $2500 MET $2556 13  REMAINING $2243 87  Insurance optimization (Limited benefit vs self-pay):  Patient assistance status:  Free Drug Applications:  Interventions:  received email from sybil hardwick that pt has some billing concerns & would like a call back  Called pt but got her voicemail left her a message to call me back        Information above was review thoroughly with patient and patient was advise of possible assistance programs/interventions  If any question arise patient can contact this writer at below information  This information was given to patient at time of contact  [facility]  Phone:[phone]  Email: [email or web address]

## 2023-03-13 ENCOUNTER — DOCUMENTATION (OUTPATIENT)
Dept: HEMATOLOGY ONCOLOGY | Facility: CLINIC | Age: 48
End: 2023-03-13

## 2023-03-13 NOTE — PROGRESS NOTES
2ND ATTEMPT  Called pt to go over her benefits & to see what her billing concerns were   Got her voicemail left her a message to call me back

## 2023-03-20 ENCOUNTER — DOCUMENTATION (OUTPATIENT)
Dept: HEMATOLOGY ONCOLOGY | Facility: CLINIC | Age: 48
End: 2023-03-20

## 2023-03-20 NOTE — PROGRESS NOTES
3RD ATTEMPT  Called pt to see what the billing concerns are got voicemail left a message for pt to call me back

## 2023-04-25 ENCOUNTER — RA CDI HCC (OUTPATIENT)
Dept: OTHER | Facility: HOSPITAL | Age: 48
End: 2023-04-25

## 2023-04-25 NOTE — PROGRESS NOTES
[facility]  coding opportunities       Chart reviewed, no opportunity found: CHART REVIEWED, NO OPPORTUNITY FOUND        Patients Insurance        Commercial Insurance: Ashley Dorsey

## 2023-04-29 DIAGNOSIS — Z79.4 TYPE 2 DIABETES MELLITUS WITHOUT COMPLICATION, WITH LONG-TERM CURRENT USE OF INSULIN (HCC): ICD-10-CM

## 2023-04-29 DIAGNOSIS — E11.9 TYPE 2 DIABETES MELLITUS WITHOUT COMPLICATION, WITH LONG-TERM CURRENT USE OF INSULIN (HCC): ICD-10-CM

## 2023-05-01 RX ORDER — ROSUVASTATIN CALCIUM 5 MG/1
TABLET, COATED ORAL
Qty: 90 TABLET | Refills: 3 | Status: SHIPPED | OUTPATIENT
Start: 2023-05-01

## 2023-05-03 ENCOUNTER — OFFICE VISIT (OUTPATIENT)
Dept: FAMILY MEDICINE CLINIC | Facility: CLINIC | Age: 48
End: 2023-05-03

## 2023-05-03 VITALS
TEMPERATURE: 98.1 F | OXYGEN SATURATION: 97 % | SYSTOLIC BLOOD PRESSURE: 120 MMHG | WEIGHT: 197.5 LBS | HEIGHT: 60 IN | HEART RATE: 83 BPM | DIASTOLIC BLOOD PRESSURE: 72 MMHG | BODY MASS INDEX: 38.77 KG/M2

## 2023-05-03 DIAGNOSIS — Z00.00 ANNUAL PHYSICAL EXAM: Primary | ICD-10-CM

## 2023-05-03 DIAGNOSIS — E66.01 CLASS 2 SEVERE OBESITY DUE TO EXCESS CALORIES WITH SERIOUS COMORBIDITY AND BODY MASS INDEX (BMI) OF 38.0 TO 38.9 IN ADULT (HCC): ICD-10-CM

## 2023-05-03 DIAGNOSIS — Z79.4 TYPE 2 DIABETES MELLITUS WITHOUT COMPLICATION, WITH LONG-TERM CURRENT USE OF INSULIN (HCC): ICD-10-CM

## 2023-05-03 DIAGNOSIS — E11.9 TYPE 2 DIABETES MELLITUS WITHOUT COMPLICATION, WITH LONG-TERM CURRENT USE OF INSULIN (HCC): ICD-10-CM

## 2023-05-03 DIAGNOSIS — E78.2 MIXED HYPERLIPIDEMIA: ICD-10-CM

## 2023-05-03 PROBLEM — U07.1 COVID-19: Status: RESOLVED | Noted: 2022-05-05 | Resolved: 2023-05-03

## 2023-05-03 NOTE — PROGRESS NOTES
[address]    NAME: Torin Voss  AGE: 49 y o  SEX: female  : 1975   DATE: 5/3/2023     Assessment and Plan:     Problem List Items Addressed This Visit        Endocrine    Type 2 diabetes mellitus without complication ([address] )       Other    Class 2 severe obesity due to excess calories with serious comorbidity and body mass index (BMI) of 38 0 to 38 9 in adult [facility])    Mixed hyperlipidemia   Other Visit Diagnoses     Annual physical exam    -  Primary          Immunizations and preventive care screenings were discussed with patient today  Appropriate education was printed on patient's after visit summary  Counseling:  Alcohol/drug use: discussed moderation in alcohol intake, the recommendations for healthy alcohol use, and avoidance of illicit drug use  Dental Health: discussed importance of regular tooth brushing, flossing, and dental visits  Exercise: the importance of regular exercise/physical activity was discussed  Recommend exercise 3-5 times per week for at least 30 minutes  BMI Counseling: Body mass index is 38 57 kg/m²  The BMI is above normal  Nutrition recommendations include decreasing portion sizes, encouraging healthy choices of fruits and vegetables and limiting drinks that contain sugar  Exercise recommendations include moderate physical activity 150 minutes/week, exercising 3-5 times per week and strength training exercises  Patient referred to PCP  Rationale for BMI follow-up plan is due to patient being overweight or obese  Depression Screening and Follow-up Plan: Patient was screened for depression during today's encounter  They screened negative with a PHQ-2 score of 0  Return in about 6 months (around 11/3/2023) for diabetes       Chief Complaint:     Chief Complaint   Patient presents with    Physical Exam      History of Present Illness:     Adult Annual Physical   Patient here for a comprehensive physical exam  The patient reports no problems  Diet and Physical Activity  Diet/Nutrition: well balanced diet and diabetic diet  Exercise: walking  Depression Screening  PHQ-2/9 Depression Screening    Little interest or pleasure in doing things: 0 - not at all  Feeling down, depressed, or hopeless: 0 - not at all  PHQ-2 Score: 0  PHQ-2 Interpretation: Negative depression screen       General Health  Sleep: sleeps well  Hearing: normal - bilateral   Vision: most recent eye exam <1 year ago and wears glasses  Dental: regular dental visits  /GYN Health  Patient is: perimenopausal  Last menstrual period: Patient's last menstrual period was 2023 (exact date)  Review of Systems:     Review of Systems   Constitutional: Negative for activity change, chills and fever  HENT: Negative for congestion, rhinorrhea and sore throat  Eyes: Negative for visual disturbance  Respiratory: Negative for cough, shortness of breath and wheezing  Cardiovascular: Negative for chest pain and palpitations  Gastrointestinal: Negative for abdominal pain, blood in stool, constipation, diarrhea, nausea and vomiting  Genitourinary: Negative for dysuria  Musculoskeletal: Negative for arthralgias and myalgias  Skin: Negative for rash  Neurological: Negative for dizziness, weakness and headaches  All other systems reviewed and are negative       Past Medical History:     Past Medical History:   Diagnosis Date    Breast lump     last assessed: 13    COVID-19 2022    [facility] )     Diabetes mellitus type II, controlled ([facility] )     Eustachian tube dysfunction     last assessed: 12/14/15    Hx of tubal ligation     Vagina, candidiasis     last assessed: 13      Past Surgical History:     Past Surgical History:   Procedure Laterality Date     SECTION      x3    TUBAL LIGATION  12    WISDOM TOOTH EXTRACTION        Social History: Social History     Socioeconomic History    Marital status: /Civil Union     Spouse name: None    Number of children: 3    Years of education: None    Highest education level: None   Occupational History    None   Tobacco Use    Smoking status: Never     Passive exposure: Never    Smokeless tobacco: Never   Vaping Use    Vaping Use: Never used   Substance and Sexual Activity    Alcohol use:  Yes     Alcohol/week: 0 0 standard drinks     Comment: Only occasionally or socially    Drug use: Never    Sexual activity: Yes     Partners: Male     Birth control/protection: Female Sterilization   Other Topics Concern    None   Social History Narrative    Coffee    Exercise frequently     Social Determinants of Health     Financial Resource Strain: Not on file   Food Insecurity: Not on file   Transportation Needs: Not on file   Physical Activity: Not on file   Stress: Not on file   Social Connections: Not on file   Intimate Partner Violence: Not on file   Housing Stability: Not on file      Family History:     Family History   Problem Relation Age of Onset    Arthritis Mother     Diabetes Mother     No Known Problems Father     No Known Problems Sister     Diabetes Maternal Grandmother     Colon cancer Paternal Grandfather     Heart disease Family     Hypertension Family       Current Medications:     Current Outpatient Medications   Medication Sig Dispense Refill    BD Pen Needle Shellie U/F 32G X 4 MM MISC       BD ULTRA-FINE LANCETS lancets USE TO INJECT INSULIN ONCE DAILY AS DIRECTED (FURTHER REFILLS TO BE GIVEN AT UPCOMING APPOINTMENT)      Continuous Blood Gluc Sensor (FreeStyle Jaxson 2 Sensor) MISC       Insulin Glargine-Lixisenatide (Insulin Glargine-Lixisenatide) 100 units-33 mcg/mL injection pen Inject 60 Units under the skin daily      metFORMIN (GLUCOPHAGE) 1000 MG tablet TAKE 1 TABLET TWICE A DAY WITH MEALS 180 tablet 3    Misc Natural Products (BLOOD SUGAR 360 PO) One touch verio strips Testing BID      rosuvastatin (CRESTOR) 5 mg tablet TAKE 1 TABLET BY MOUTH EVERY DAY 90 tablet 3     No current facility-administered medications for this visit  Allergies:     No Known Allergies   Physical Exam:     /72   Pulse 83   Temp 98 1 °F (36 7 °C)   Ht 5' (1 524 m)   Wt 89 6 kg (197 lb 8 oz)   LMP 04/01/2023 (Exact Date)   SpO2 97%   BMI 38 57 kg/m²     Physical Exam  Vitals and nursing note reviewed  Constitutional:       General: She is not in acute distress  Appearance: She is well-developed  She is not ill-appearing  HENT:      Head: Normocephalic and atraumatic  Right Ear: Tympanic membrane, ear canal and external ear normal  No middle ear effusion  Left Ear: Tympanic membrane, ear canal and external ear normal   No middle ear effusion  Nose: Nose normal  No congestion or rhinorrhea  Mouth/Throat:      Lips: Pink  Mouth: Mucous membranes are moist       Pharynx: Oropharynx is clear  Uvula midline  No oropharyngeal exudate  Tonsils: No tonsillar exudate  Eyes:      General: Lids are normal       Extraocular Movements: Extraocular movements intact  Conjunctiva/sclera: Conjunctivae normal       Pupils: Pupils are equal, round, and reactive to light  Neck:      Thyroid: No thyromegaly  Trachea: No tracheal deviation  Cardiovascular:      Rate and Rhythm: Normal rate and regular rhythm  Pulses: Normal pulses  no weak pulses          Dorsalis pedis pulses are 2+ on the right side and 2+ on the left side  Posterior tibial pulses are 2+ on the right side and 2+ on the left side  Heart sounds: Normal heart sounds, S1 normal and S2 normal  No murmur heard  Pulmonary:      Effort: Pulmonary effort is normal  No respiratory distress  Breath sounds: Normal breath sounds  No decreased breath sounds, wheezing, rhonchi or rales  Abdominal:      General: Bowel sounds are normal  There is no distension  Palpations: Abdomen is soft  Tenderness: There is no abdominal tenderness  Musculoskeletal:      Right lower leg: No edema  Left lower leg: No edema  Feet:      Right foot:      Skin integrity: No ulcer, skin breakdown, erythema, warmth, callus or dry skin  Left foot:      Skin integrity: No ulcer, skin breakdown, erythema, warmth, callus or dry skin  Lymphadenopathy:      Cervical: No cervical adenopathy  Skin:     General: Skin is warm and dry  Capillary Refill: Capillary refill takes less than 2 seconds  Neurological:      Mental Status: She is alert and oriented to person, place, and time  Deep Tendon Reflexes: Reflexes normal       Reflex Scores:       Patellar reflexes are 2+ on the right side and 2+ on the left side  Psychiatric:         Attention and Perception: Attention normal          Mood and Affect: Mood normal          Thought Content: Thought content does not include suicidal ideation  Diabetic Foot Exam    Patient's shoes and socks removed  Right Foot/Ankle   Right Foot Inspection  Skin Exam: skin normal and skin intact  No dry skin, no warmth, no callus, no erythema, no maceration, no abnormal color, no pre-ulcer, no ulcer and no callus  Toe Exam: ROM and strength within normal limits  Sensory   Vibration: intact  Monofilament testing: intact    Vascular  Capillary refills: < 3 seconds  The right DP pulse is 2+  The right PT pulse is 2+  Left Foot/Ankle  Left Foot Inspection  Skin Exam: skin normal and skin intact  No dry skin, no warmth, no erythema, no maceration, normal color, no pre-ulcer, no ulcer and no callus  Toe Exam: ROM and strength within normal limits  Sensory   Vibration: intact  Monofilament testing: intact    Vascular  Capillary refills: < 3 seconds  The left DP pulse is 2+  The left PT pulse is 2+       Assign Risk Category  No deformity present  No loss of protective sensation  No weak pulses  Risk: 0      [facility] MD Lia Hernandez

## 2023-06-17 ENCOUNTER — APPOINTMENT (OUTPATIENT)
Dept: LAB | Facility: MEDICAL CENTER | Age: 48
End: 2023-06-17
Payer: COMMERCIAL

## 2023-06-17 DIAGNOSIS — D72.829 LEUKOCYTOSIS, UNSPECIFIED TYPE: ICD-10-CM

## 2023-06-17 DIAGNOSIS — D72.89 ATYPICAL LYMPHOCYTES PRESENT ON PERIPHERAL BLOOD SMEAR: ICD-10-CM

## 2023-06-17 LAB
BASOPHILS NFR BLD MANUAL: 1 % (ref 0–1)
ERYTHROCYTE [DISTWIDTH] IN BLOOD BY AUTOMATED COUNT: 13.7 % (ref 11.6–15.1)
HCT VFR BLD AUTO: 42.9 % (ref 34.8–46.1)
HGB BLD-MCNC: 13.1 G/DL (ref 11.5–15.4)
IMM EOSINOPHIL NFR BLD MANUAL: 5 % (ref 0–6)
LYMPHOCYTES NFR BLD: 27 % (ref 14–44)
MCH RBC QN AUTO: 28.3 PG (ref 26.8–34.3)
MCHC RBC AUTO-ENTMCNC: 30.5 G/DL (ref 31.4–37.4)
MCV RBC AUTO: 93 FL (ref 82–98)
MONOCYTES NFR BLD AUTO: 4 % (ref 4–12)
NEUTS SEG NFR BLD AUTO: 61 % (ref 45–77)
NRBC BLD AUTO-RTO: 0 /100 WBCS
PLATELET # BLD AUTO: 344 THOUSANDS/UL (ref 149–390)
PMV BLD AUTO: 11.5 FL (ref 8.9–12.7)
RBC # BLD AUTO: 4.63 MILLION/UL (ref 3.81–5.12)
RBC MORPH BLD: NORMAL
TOTAL CELLS COUNTED SPEC: 100
VARIANT LYMPHS BLD QL SMEAR: 2 % (ref 0–0)
WBC # BLD AUTO: 10.26 THOUSAND/UL (ref 4.31–10.16)

## 2023-06-17 PROCEDURE — 85007 BL SMEAR W/DIFF WBC COUNT: CPT

## 2023-06-17 PROCEDURE — 36415 COLL VENOUS BLD VENIPUNCTURE: CPT

## 2023-07-05 ENCOUNTER — TELEPHONE (OUTPATIENT)
Dept: OBGYN CLINIC | Facility: CLINIC | Age: 48
End: 2023-07-05

## 2023-07-05 NOTE — TELEPHONE ENCOUNTER
----- Message from Lorenzo Marroquin sent at 7/3/2023  4:14 PM EDT -----  Was under Alonza Najjar name    ----- Message -----  From: Sixto Quintana  Sent: 7/3/2023   4:13 PM EDT  To: Cliff Stahl MD; #

## 2023-07-05 NOTE — TELEPHONE ENCOUNTER
Please let her know mammogram is negative. Based on her lifetime risk and breast density she may benefit from additional screening with Automated Breast Ultrasound. This is not always covered by insurance,  I recommend checking coverage first. If patient desires to have the additional testing she may contact our office and we can order.

## 2023-07-05 NOTE — TELEPHONE ENCOUNTER
.Per comm consent lm to pt with results and recommendations of mammo from [facility].  Offered ABUS

## 2023-07-12 ENCOUNTER — TELEPHONE (OUTPATIENT)
Dept: OBGYN CLINIC | Facility: CLINIC | Age: 48
End: 2023-07-12

## 2023-07-12 NOTE — TELEPHONE ENCOUNTER
----- Message from Chino Tse sent at 7/12/2023  9:45 AM EDT -----  Regarding: Breast ultrasound  Contact: [phone]  [place] Showpadma    I was told that an ultrasound was recommended after my mammogram. I have contacted my insurance company and it is covered under my insurance. If you feel that it is necessary to have the test done, please put in a scheduling ticket so that I can make the appointment. Thanks so much.      Brittnee Stevenson

## 2023-07-18 ENCOUNTER — HOSPITAL ENCOUNTER (OUTPATIENT)
Dept: ULTRASOUND IMAGING | Facility: CLINIC | Age: 48
Discharge: HOME | End: 2023-07-18
Payer: COMMERCIAL

## 2023-07-18 DIAGNOSIS — R92.2 DENSE BREAST TISSUE ON MAMMOGRAM: ICD-10-CM

## 2023-07-18 PROCEDURE — 76641 ULTRASOUND BREAST COMPLETE: CPT

## 2023-08-31 ENCOUNTER — TELEPHONE (OUTPATIENT)
Dept: HEMATOLOGY ONCOLOGY | Facility: CLINIC | Age: 48
End: 2023-08-31

## 2023-08-31 NOTE — TELEPHONE ENCOUNTER
Called and left patient a voice message stating that there are labs that need to be collected prior to her appointment with Marko Conway on 9/5. Mentioned that the orders are already in the system and that as long as patient goes to a [facility] there is no paper work needed. Mentioned that if patient is unable to have labs collected to please give the office a call back as we may need to reschedule her appointment.

## 2023-09-01 ENCOUNTER — APPOINTMENT (OUTPATIENT)
Dept: LAB | Facility: MEDICAL CENTER | Age: 48
End: 2023-09-01
Payer: COMMERCIAL

## 2023-09-01 DIAGNOSIS — D72.829 LEUKOCYTOSIS, UNSPECIFIED TYPE: ICD-10-CM

## 2023-09-01 DIAGNOSIS — D72.89 ATYPICAL LYMPHOCYTES PRESENT ON PERIPHERAL BLOOD SMEAR: ICD-10-CM

## 2023-09-01 LAB
BASOPHILS NFR BLD MANUAL: 1 % (ref 0–1)
ERYTHROCYTE [DISTWIDTH] IN BLOOD BY AUTOMATED COUNT: 13.6 % (ref 11.6–15.1)
HCT VFR BLD AUTO: 39.5 % (ref 34.8–46.1)
HGB BLD-MCNC: 12.9 G/DL (ref 11.5–15.4)
IMM EOSINOPHIL NFR BLD MANUAL: 4 % (ref 0–6)
LG PLATELETS BLD QL SMEAR: PRESENT
LYMPHOCYTES NFR BLD: 26 % (ref 14–44)
MCH RBC QN AUTO: 29.9 PG (ref 26.8–34.3)
MCHC RBC AUTO-ENTMCNC: 32.7 G/DL (ref 31.4–37.4)
MCV RBC AUTO: 91 FL (ref 82–98)
MONOCYTES NFR BLD AUTO: 5 % (ref 4–12)
NEUTS SEG NFR BLD AUTO: 62 % (ref 45–77)
NRBC BLD AUTO-RTO: 0 /100 WBCS
PLATELET # BLD AUTO: 306 THOUSANDS/UL (ref 149–390)
PMV BLD AUTO: 11.4 FL (ref 8.9–12.7)
POLYCHROMASIA BLD QL SMEAR: PRESENT
RBC # BLD AUTO: 4.32 MILLION/UL (ref 3.81–5.12)
TOTAL CELLS COUNTED SPEC: 100
VARIANT LYMPHS BLD QL SMEAR: 2 % (ref 0–0)
WBC # BLD AUTO: 10.91 THOUSAND/UL (ref 4.31–10.16)

## 2023-09-01 PROCEDURE — 36415 COLL VENOUS BLD VENIPUNCTURE: CPT

## 2023-09-01 PROCEDURE — 85007 BL SMEAR W/DIFF WBC COUNT: CPT

## 2023-09-23 ENCOUNTER — APPOINTMENT (OUTPATIENT)
Dept: LAB | Facility: MEDICAL CENTER | Age: 48
End: 2023-09-23
Payer: COMMERCIAL

## 2023-09-23 DIAGNOSIS — E11.9 TYPE 2 DIABETES MELLITUS WITHOUT COMPLICATION, WITH LONG TERM CURRENT USE OF INSULIN PUMP: ICD-10-CM

## 2023-09-23 DIAGNOSIS — Z96.41 TYPE 2 DIABETES MELLITUS WITHOUT COMPLICATION, WITH LONG TERM CURRENT USE OF INSULIN PUMP: ICD-10-CM

## 2023-09-23 DIAGNOSIS — E78.2 MIXED HYPERLIPIDEMIA: ICD-10-CM

## 2023-09-23 LAB
ANION GAP SERPL CALCULATED.3IONS-SCNC: 12 MMOL/L
BUN SERPL-MCNC: 11 MG/DL (ref 5–25)
CALCIUM SERPL-MCNC: 9.1 MG/DL (ref 8.4–10.2)
CHLORIDE SERPL-SCNC: 103 MMOL/L (ref 96–108)
CHOLEST SERPL-MCNC: 111 MG/DL
CO2 SERPL-SCNC: 24 MMOL/L (ref 21–32)
CREAT SERPL-MCNC: 0.6 MG/DL (ref 0.6–1.3)
EST. AVERAGE GLUCOSE BLD GHB EST-MCNC: 197 MG/DL
GFR SERPL CREATININE-BSD FRML MDRD: 108 ML/MIN/1.73SQ M
GLUCOSE P FAST SERPL-MCNC: 132 MG/DL (ref 65–99)
HBA1C MFR BLD: 8.5 %
HDLC SERPL-MCNC: 41 MG/DL
LDLC SERPL CALC-MCNC: 45 MG/DL (ref 0–100)
NONHDLC SERPL-MCNC: 70 MG/DL
POTASSIUM SERPL-SCNC: 4.2 MMOL/L (ref 3.5–5.3)
SODIUM SERPL-SCNC: 139 MMOL/L (ref 135–147)
TRIGL SERPL-MCNC: 126 MG/DL

## 2023-09-23 PROCEDURE — 36415 COLL VENOUS BLD VENIPUNCTURE: CPT

## 2023-09-23 PROCEDURE — 80048 BASIC METABOLIC PNL TOTAL CA: CPT

## 2023-09-23 PROCEDURE — 83036 HEMOGLOBIN GLYCOSYLATED A1C: CPT

## 2023-09-23 PROCEDURE — 80061 LIPID PANEL: CPT

## 2023-09-28 ENCOUNTER — OFFICE VISIT (OUTPATIENT)
Dept: FAMILY MEDICINE CLINIC | Facility: CLINIC | Age: 48
End: 2023-09-28
Payer: COMMERCIAL

## 2023-09-28 VITALS
TEMPERATURE: 97.8 F | HEIGHT: 60 IN | BODY MASS INDEX: 38.87 KG/M2 | DIASTOLIC BLOOD PRESSURE: 76 MMHG | HEART RATE: 81 BPM | SYSTOLIC BLOOD PRESSURE: 138 MMHG | OXYGEN SATURATION: 98 % | WEIGHT: 198 LBS

## 2023-09-28 DIAGNOSIS — J01.90 ACUTE SINUSITIS, RECURRENCE NOT SPECIFIED, UNSPECIFIED LOCATION: Primary | ICD-10-CM

## 2023-09-28 PROCEDURE — 99213 OFFICE O/P EST LOW 20 MIN: CPT | Performed by: FAMILY MEDICINE

## 2023-09-28 RX ORDER — PREDNISONE 5 MG/1
TABLET ORAL
Qty: 21 TABLET | Refills: 0 | Status: SHIPPED | OUTPATIENT
Start: 2023-09-28

## 2023-09-28 RX ORDER — BLOOD SUGAR DIAGNOSTIC
STRIP MISCELLANEOUS
COMMUNITY
Start: 2023-09-27

## 2023-09-28 RX ORDER — CEFUROXIME AXETIL 500 MG/1
500 TABLET ORAL EVERY 12 HOURS SCHEDULED
Qty: 20 TABLET | Refills: 0 | Status: SHIPPED | OUTPATIENT
Start: 2023-09-28 | End: 2023-10-08

## 2023-09-28 RX ORDER — LANCETS 33 GAUGE
EACH MISCELLANEOUS
COMMUNITY
Start: 2023-09-27

## 2023-09-28 NOTE — PROGRESS NOTES
Patient ID: Nusrat Whiting is a 49 y.o. female. HPI: 49 y. o.female presenting with symptoms of sinus pain,pressure, nasal congestion, pnd dry cough , ear and throat pain. Symptoms for past 72 hrs. SUBJECTIVE    Family History   Problem Relation Age of Onset   • Arthritis Mother    • Diabetes Mother    • No Known Problems Father    • No Known Problems Sister    • Diabetes Maternal Grandmother    • Colon cancer Paternal Grandfather    • Heart disease Family    • Hypertension Family      Social History     Socioeconomic History   • Marital status: /Civil Union     Spouse name: Not on file   • Number of children: 3   • Years of education: Not on file   • Highest education level: Not on file   Occupational History   • Not on file   Tobacco Use   • Smoking status: Never     Passive exposure: Never   • Smokeless tobacco: Never   Vaping Use   • Vaping Use: Never used   Substance and Sexual Activity   • Alcohol use: Yes     Alcohol/week: 0.0 standard drinks of alcohol     Comment: Only occasionally or socially   • Drug use: Never   • Sexual activity: Yes     Partners: Male     Birth control/protection: Female Sterilization   Other Topics Concern   • Not on file   Social History Narrative    Coffee    Exercise frequently     Social Determinants of Health     Financial Resource Strain: Low Risk  (4/26/2021)    Overall Financial Resource Strain (CARDIA)    • Difficulty of Paying Living Expenses: Not hard at all   Food Insecurity: No Food Insecurity (4/26/2021)    Hunger Vital Sign    • Worried About Running Out of Food in the Last Year: Never true    • Ran Out of Food in the Last Year: Never true   Transportation Needs: No Transportation Needs (4/26/2021)    PRAPARE - Transportation    • Lack of Transportation (Medical): No    • Lack of Transportation (Non-Medical):  No   Physical Activity: Inactive (4/26/2021)    Exercise Vital Sign    • Days of Exercise per Week: 0 days    • Minutes of Exercise per Session: 0 min   Stress: No Stress Concern Present (2021)    [address] [facility]    • Feeling of Stress : Only a little   Social Connections: Unknown (2021)    Social Connection and Isolation Panel [NHANES]    • Frequency of Communication with Friends and Family: Not on file    • Frequency of Social Gatherings with Friends and Family: Not on file    • Attends Congregation Services: Not on file    • Active Member of Clubs or Organizations: Not on file    • Attends Club or Organization Meetings: Not on file    • Marital Status:    Intimate Partner Violence: Not on file   Housing Stability: Not on file     Past Medical History:   Diagnosis Date   • Breast lump     last assessed: 13   • COVID-19 2022   • Diabetes mellitus ([address])    • Diabetes mellitus type II, controlled ([address])    • Eustachian tube dysfunction     last assessed: 12/14/15   • Hx of tubal ligation    • Vagina, candidiasis     last assessed: 13     Past Surgical History:   Procedure Laterality Date   • BREAST BIOPSY Right     MRI Breast BX    •  SECTION      x3   • TUBAL LIGATION  2012   • WISDOM TOOTH EXTRACTION       No Known Allergies    Current Outpatient Medications:   •  BD Pen Needle Shellie U/F 32G X 4 MM MISC, , Disp: , Rfl:   •  BD ULTRA-FINE LANCETS lancets, USE TO INJECT INSULIN ONCE DAILY AS DIRECTED (FURTHER REFILLS TO BE GIVEN AT UPCOMING APPOINTMENT), Disp: , Rfl:   •  cefuroxime (CEFTIN) 500 mg tablet, Take 1 tablet (500 mg total) by mouth every 12 (twelve) hours for 10 days, Disp: 20 tablet, Rfl: 0  •  Continuous Blood Gluc Sensor (FreeStyle Jaxson 2 Sensor) MISC, , Disp: , Rfl:   •  Insulin Glargine-Lixisenatide (Insulin Glargine-Lixisenatide) 100 units-33 mcg/mL injection pen, Inject 60 Units under the skin daily, Disp: , Rfl:   •  Lancets 33G MISC, Use to test glucoses BID.  One Touch Delica., Disp: , Rfl:   •  metFORMIN (GLUCOPHAGE) 1000 MG tablet, TAKE 1 TABLET TWICE A DAY WITH MEALS, Disp: 180 tablet, Rfl: 1  •  Misc Natural Products (BLOOD SUGAR 360 PO), One touch verio strips Testing BID, Disp: , Rfl:   •  OneTouch Verio test strip, , Disp: , Rfl:   •  predniSONE 5 mg tablet, 2 po bid x3 days then 1 po bidx3 days then 1 daily x3days, Disp: 21 tablet, Rfl: 0  •  rosuvastatin (CRESTOR) 5 mg tablet, TAKE 1 TABLET BY MOUTH EVERY DAY, Disp: 90 tablet, Rfl: 3    Review of Systems  Constitutional:     Denies fever, chills, fatigue, weakness ,weight loss, weight gain      ENT: Denies earache, loss of hearing, nosebleed, nasal discharge,but complains of nasal congestion, sore throat,hoarseness and sinus pain and pressure    Pulmonary: Denies shortness of breath ,cough , dyspnea on exertionon, orthopnea ,+ PND   Cardiovascular:  Denies bradycardia , tachycardia ,palpations, lower extremity, edema leg, claudication  Breast:  Denies new or changing breast lumps,  nipple discharge, nipple changes,  Abdomen:  Denies abdominal pain , anorexia ,indigestion, nausea ,vomiting, constipation , diarrhea  Musculoskeletal: Denies myalgias, arthralgias, joint swelling, joint stiffness ,limb pain, limb swelling  Lymph:+ swollen glands  Gu: no dysuria or urinary frequency  Skin: Denies skin rash, skin lesion, skin wound, itching,dry skin  Neuro: Denies headache, numbness, tingling, confusion, loss of consciousness, dizziness ,vertigo  Psychiatric: Denies feelings of depression, suicidal ideation, anxiety, sleep disturbances    OBJECTIVE  /76   Pulse 81   Temp 97.8 °F (36.6 °C)   Ht 5' (1.524 m)   Wt 89.8 kg (198 lb)   LMP 09/12/2023   SpO2 98%   BMI 38.67 kg/m²   Constitutional:   NAD, well appearing and well nourished      ENT:   Conjunctiva and lids: no injection, edema, or discharge    Pupils and iris: SWAPNA bilaterally   External inspection of ears and nose: normal without deformities or discharge.       Otoscopic exam: Canals patent ; tm are dull, with with erythem and effusions  ENasal mucosa, septum and turbinates: Turbinae injection with discharge   Oropharynx:  Moist mucosa, normal tongue and tonsils without lesions. Erythema and injection  of post pharynx with pnd      Pulmonary:Respiratory effort normal rate and rhythm, no increased work of breathing. Auscultation of lungs:  Clear bilaterally with no adventitious breath sounds       Cardiovascular: regular rate and rhythm, S1 and S2, no murmur, no edema and/or varicosities of LE      Abdomen: Soft and non-distended    Positive bowel sounds    No heptomegaly or splenomegaly    Lymphatic: Anterior  cervical lymphadenopathy         Muscskeletal:  Gait and station: Normal gait     Digits and nails normal without clubbing or cyanosis     Inspection/palpation of joints, bones, and muscles:  No joint tenderness, swelling, full active and passive range of motion      Gu: no suprabubic tenderness, CVA tenderness or urethral discharge  Skin: Normal skin turgor and no rashes    Neuro:    Normal reflexes   Psych:   alert and oriented to person, place and time  normal mood and affect      Assessment/Plan:Diagnoses and all orders for this visit:    Acute sinusitis, recurrence not specified, unspecified location  -     predniSONE 5 mg tablet; 2 po bid x3 days then 1 po bidx3 days then 1 daily x3days  -     cefuroxime (CEFTIN) 500 mg tablet; Take 1 tablet (500 mg total) by mouth every 12 (twelve) hours for 10 days    Other orders  -     OneTouch Verio test strip  -     Lancets 33G MISC; Use to test glucoses BID. One Touch Delica. Reviewed with patient plan to treat with above plan.     Patient instructed to call in 72 hours if not feeling better or if symptoms worsen

## 2023-11-15 ENCOUNTER — RA CDI HCC (OUTPATIENT)
Dept: OTHER | Facility: HOSPITAL | Age: 48
End: 2023-11-15

## 2023-11-15 NOTE — PROGRESS NOTES
[address] [facility] coding opportunities          Chart Reviewed number of suggestions sent to Provider: 1  E11.319     Patients Insurance        Commercial Insurance: [address]

## 2023-11-27 ENCOUNTER — OFFICE VISIT (OUTPATIENT)
Dept: FAMILY MEDICINE CLINIC | Facility: CLINIC | Age: 48
End: 2023-11-27
Payer: COMMERCIAL

## 2023-11-27 VITALS
HEART RATE: 76 BPM | BODY MASS INDEX: 38.18 KG/M2 | WEIGHT: 194.5 LBS | HEIGHT: 60 IN | DIASTOLIC BLOOD PRESSURE: 76 MMHG | SYSTOLIC BLOOD PRESSURE: 120 MMHG | TEMPERATURE: 98.3 F | OXYGEN SATURATION: 99 %

## 2023-11-27 DIAGNOSIS — E11.9 TYPE 2 DIABETES MELLITUS WITHOUT COMPLICATION, WITH LONG-TERM CURRENT USE OF INSULIN (HCC): Primary | ICD-10-CM

## 2023-11-27 DIAGNOSIS — Z79.4 TYPE 2 DIABETES MELLITUS WITHOUT COMPLICATION, WITH LONG-TERM CURRENT USE OF INSULIN (HCC): Primary | ICD-10-CM

## 2023-11-27 PROCEDURE — 99213 OFFICE O/P EST LOW 20 MIN: CPT | Performed by: FAMILY MEDICINE

## 2023-11-27 RX ORDER — BLOOD-GLUCOSE METER
EACH MISCELLANEOUS
COMMUNITY
Start: 2023-09-27

## 2023-11-27 NOTE — PROGRESS NOTES
Assessment/Plan:    No problem-specific Assessment & Plan notes found for this encounter. Diagnoses and all orders for this visit:    Type 2 diabetes mellitus without complication, with long-term current use of insulin (HCC)  -     Comprehensive metabolic panel; Future  -     CBC and Platelet; Future  -     Albumin / creatinine urine ratio; Future  -     Lipid Panel with Direct LDL reflex; Future  -     TSH, 3rd generation with Free T4 reflex; Future  -     Hemoglobin A1c (w/out EAG) ([facility] ONLY); Future  -     Hemoglobin A1c (w/out EAG) ([facility] ONLY)    Other orders  -     Blood Glucose Monitoring Suppl (OneTouch Verio Flex System) w/Device KIT; USE AS DIRECTED. ONE TOUCH VERIO          Subjective:      Patient ID: Antonia Egan is a 49 y.o. female. HPI Patient presents for diabetic follow up. She is doing well. Has the [employer] [place]. She was having hypoglycemic episodes but her endocrinologist reduced her insulin from 60 to 50u daily with improvement. She does not get symptoms with her hypoglycemia until she's in the 50's. She does have glucose tabs. The following portions of the patient's history were reviewed and updated as appropriate: allergies, current medications, past family history, past medical history, past social history, past surgical history, and problem list.    Review of Systems   Constitutional:  Negative for activity change, chills and fever. HENT:  Negative for congestion, rhinorrhea and sore throat. Eyes:  Negative for visual disturbance. Respiratory:  Negative for cough, shortness of breath and wheezing. Cardiovascular:  Negative for chest pain and palpitations. Gastrointestinal:  Negative for abdominal pain, blood in stool, constipation, diarrhea, nausea and vomiting. Genitourinary:  Negative for dysuria. Musculoskeletal:  Negative for arthralgias and myalgias. Skin:  Negative for rash. Neurological:  Negative for dizziness, weakness and headaches.    All other systems reviewed and are negative. Objective:      /76   Pulse 76   Temp 98.3 °F (36.8 °C)   Ht 5' (1.524 m)   Wt 88.2 kg (194 lb 8 oz)   LMP 11/16/2023 (Exact Date)   SpO2 99%   BMI 37.99 kg/m²          Physical Exam  Vitals and nursing note reviewed. Constitutional:       General: She is not in acute distress. Appearance: Normal appearance. She is well-developed. She is not ill-appearing or diaphoretic. HENT:      Head: Normocephalic and atraumatic. Right Ear: External ear normal.      Left Ear: External ear normal.      Nose: Nose normal.   Eyes:      General: Lids are normal.      Conjunctiva/sclera: Conjunctivae normal.   Neck:      Vascular: No JVD. Trachea: No tracheal deviation. Pulmonary:      Effort: No accessory muscle usage or respiratory distress. Skin:     Findings: No rash. Neurological:      Mental Status: She is alert.

## 2024-01-11 ENCOUNTER — ANNUAL EXAM (OUTPATIENT)
Dept: OBGYN CLINIC | Facility: MEDICAL CENTER | Age: 49
End: 2024-01-11
Payer: COMMERCIAL

## 2024-01-11 VITALS
BODY MASS INDEX: 35.57 KG/M2 | HEIGHT: 61 IN | WEIGHT: 188.4 LBS | SYSTOLIC BLOOD PRESSURE: 138 MMHG | DIASTOLIC BLOOD PRESSURE: 82 MMHG

## 2024-01-11 DIAGNOSIS — Z01.419 WELL WOMAN EXAM WITH ROUTINE GYNECOLOGICAL EXAM: Primary | ICD-10-CM

## 2024-01-11 DIAGNOSIS — Z12.31 ENCOUNTER FOR SCREENING MAMMOGRAM FOR MALIGNANT NEOPLASM OF BREAST: ICD-10-CM

## 2024-01-11 DIAGNOSIS — R92.30 DENSE BREAST TISSUE ON MAMMOGRAM, UNSPECIFIED TYPE: ICD-10-CM

## 2024-01-11 PROCEDURE — 99396 PREV VISIT EST AGE 40-64: CPT | Performed by: PHYSICIAN ASSISTANT

## 2024-01-11 NOTE — PROGRESS NOTES
Assessment   48 y.o.  presenting for annual exam.     Plan   Diagnoses and all orders for this visit:    Well woman exam with routine gynecological exam    Encounter for screening mammogram for malignant neoplasm of breast  -     Mammo screening bilateral w 3d & cad; Future    Dense breast tissue on mammogram, unspecified type  -     US breast screening bilateral complete (ABUS); Future        Pap up to date  Mammo slip given. ABUS given- dense breast tissue    Colonoscopy up to date    Perineal hygiene reviewed. Weight bearing exercises minium of 150 mins/weekly advised. Kegel exercises recommended.   SBE encouraged, A yearly mammogram is recommended for breast cancer screening starting at age 40. ASCCP guidelines reviewed. Calcium/ Vit D dietary requirements discussed.   Advised to call with any issues, all concerns & questions addressed.   See provided information in your after visit summary     F/U Annually and PRN    Results will be released to [place], if abnormal will call or message to review and discuss treatment plan.     __________________________________________________________________    Subjective     Kiesha Hannon is a 48 y.o.  presenting for annual exam. She is without complaint     SCREENING  Last Pap: 2023 neg/neg  Last Mammo: 2023 birads 2; extremely dense breasts ABUS performed 2023  Last Colonoscopy: 2020 5 year recall      GYN  The patient's menstrual history is as follows:    ;  ; Period Cycle (Days): 32; Period Duration (Days): 5; Period Pattern: Regular; Menstrual Flow: Heavy; Dysmenorrhea: None;      Sexually active: Yes - single partner - male  Contraception: tubal    Hx Abnormal pap: denies  We reviewed ASCCP guidelines for Pap testing today.    Denies vaginal discharge, itching, odor, dyspareunia, pelvic pain and vulvar/vaginal symptoms      OB           Complaints: denies   Denies urgency, frequency, hematuria, leakage / change in stream,  difficulty urinating.       BREAST  Complaints: denies   Denies: breast lump, breast tenderness, nipple discharge, skin color change, and skin lesion(s)      Pertinent Family Hx:   Family hx of breast cancer: no  Family hx of ovarian cancer: no  Family hx of colon cancer: PGF      GENERAL  PMH reviewed/updated and is as below.   Patient does follow with a PCP.    SOCIAL  Smoking: no  Alcohol:social  Drug: no  Occupation: for        Past Medical History:   Diagnosis Date    Breast lump     last assessed: 13    COVID-19 2022    Diabetes mellitus (HCC)     Diabetes mellitus type II, controlled (HCC)     Eustachian tube dysfunction     last assessed: 12/14/15    Hx of tubal ligation     Miscarriage     Vagina, candidiasis     last assessed: 13       Past Surgical History:   Procedure Laterality Date    BREAST BIOPSY Right     MRI Breast BX      SECTION      x3    TUBAL LIGATION  2012    WISDOM TOOTH EXTRACTION           Current Outpatient Medications:     BD Pen Needle Shellie U/F 32G X 4 MM MISC, , Disp: , Rfl:     BD ULTRA-FINE LANCETS lancets, USE TO INJECT INSULIN ONCE DAILY AS DIRECTED (FURTHER REFILLS TO BE GIVEN AT UPCOMING APPOINTMENT), Disp: , Rfl:     Blood Glucose Monitoring Suppl (OneTouch Verio Flex System) w/Device KIT, USE AS DIRECTED. ONE TOUCH VERIO, Disp: , Rfl:     Continuous Blood Gluc Sensor (FreeStyle Jaxson 2 Sensor) MISC, , Disp: , Rfl:     Insulin Glargine-Lixisenatide (Insulin Glargine-Lixisenatide) 100 units-33 mcg/mL injection pen, Inject 50 Units under the skin daily, Disp: , Rfl:     Lancets 33G MISC, Use to test glucoses BID. One Touch Delica., Disp: , Rfl:     metFORMIN (GLUCOPHAGE) 1000 MG tablet, TAKE 1 TABLET TWICE A DAY WITH MEALS, Disp: 180 tablet, Rfl: 1    Misc Natural Products (BLOOD SUGAR 360 PO), One touch verio strips Testing BID, Disp: , Rfl:     OneTouch Verio test strip, , Disp: , Rfl:     rosuvastatin (CRESTOR) 5 mg  tablet, TAKE 1 TABLET BY MOUTH EVERY DAY, Disp: 90 tablet, Rfl: 3    No Known Allergies    Social History     Socioeconomic History    Marital status: /Civil Union     Spouse name: Not on file    Number of children: 3    Years of education: Not on file    Highest education level: Not on file   Occupational History    Not on file   Tobacco Use    Smoking status: Never     Passive exposure: Never    Smokeless tobacco: Never   Vaping Use    Vaping status: Never Used   Substance and Sexual Activity    Alcohol use: Yes     Comment: Only occasionally or socially    Drug use: Never    Sexual activity: Yes     Partners: Male     Birth control/protection: Female Sterilization   Other Topics Concern    Not on file   Social History Narrative    Coffee    Exercise frequently     Social Determinants of Health     Financial Resource Strain: Low Risk  (4/26/2021)    Overall Financial Resource Strain (CARDIA)     Difficulty of Paying Living Expenses: Not hard at all   Food Insecurity: No Food Insecurity (4/26/2021)    Hunger Vital Sign     Worried About Running Out of Food in the Last Year: Never true     Ran Out of Food in the Last Year: Never true   Transportation Needs: No Transportation Needs (4/26/2021)    PRAPARE - Transportation     Lack of Transportation (Medical): No     Lack of Transportation (Non-Medical): No   Physical Activity: Inactive (4/26/2021)    Exercise Vital Sign     Days of Exercise per Week: 0 days     Minutes of Exercise per Session: 0 min   Stress: No Stress Concern Present (4/26/2021)    [employer] - Occupational Stress Questionnaire     Feeling of Stress : Only a little   Social Connections: Unknown (4/26/2021)    Social Connection and Isolation Panel [NHANES]     Frequency of Communication with Friends and Family: Not on file     Frequency of Social Gatherings with Friends and Family: Not on file     Attends [employer] Services: Not on file     Active Member of Clubs  "or Organizations: Not on file     Attends Club or Organization Meetings: Not on file     Marital Status:    Intimate Partner Violence: Not on file   Housing Stability: Not on file       Review of Systems     ROS:  Constitutional: Negative for fatigue and unexpected weight change.   Respiratory: Negative for cough and shortness of breath.    Cardiovascular: Negative for chest pain and palpitations.   Gastrointestinal: Negative for abdominal pain and change in bowel habits  Breasts:  Negative, other than as noted above.   Genitourinary: Negative, other than as noted above.   Psychiatric: Negative for mood difficulties.      Objective      /82 (BP Location: Left arm, Patient Position: Sitting, Cuff Size: Standard)   Ht 5' 0.75\" (1.543 m)   Wt 85.5 kg (188 lb 6.4 oz)   LMP 12/30/2023 (Exact Date)   BMI 35.89 kg/m²     Physical Examination:    Patient appears well and is not in distress  Neck is supple without masses, no cervical or supraclavicular lymphadenopathy  Cardiovascular: regular rate and rhythm; no murmurs  Lungs: clear to auscultation bilaterally; no wheezes  Breasts are symmetrical without mass, tenderness, nipple discharge, skin changes or adenopathy.   Abdomen is soft and nontender without masses.   External genitals are normal without lesions or rashes.  Urethral meatus and urethra are normal  Bladder is normal to palpation  Vagina is normal without discharge or bleeding.   Cervix is normal without discharge or lesion.   Uterus is normal, mobile, nontender without palpable mass.  Adnexa are normal, nontender, without palpable mass.                 "

## 2024-01-29 DIAGNOSIS — D72.89 ATYPICAL LYMPHOCYTES PRESENT ON PERIPHERAL BLOOD SMEAR: ICD-10-CM

## 2024-01-29 DIAGNOSIS — D47.1 MYELOPROLIFERATIVE DISORDER (HCC): ICD-10-CM

## 2024-01-29 DIAGNOSIS — D72.829 LEUKOCYTOSIS, UNSPECIFIED TYPE: Primary | ICD-10-CM

## 2024-02-09 ENCOUNTER — PATIENT MESSAGE (OUTPATIENT)
Dept: FAMILY MEDICINE CLINIC | Facility: CLINIC | Age: 49
End: 2024-02-09

## 2024-02-09 DIAGNOSIS — E66.01 CLASS 2 SEVERE OBESITY DUE TO EXCESS CALORIES WITH SERIOUS COMORBIDITY AND BODY MASS INDEX (BMI) OF 38.0 TO 38.9 IN ADULT: Primary | ICD-10-CM

## 2024-02-10 ENCOUNTER — APPOINTMENT (OUTPATIENT)
Dept: LAB | Facility: MEDICAL CENTER | Age: 49
End: 2024-02-10
Payer: COMMERCIAL

## 2024-02-10 DIAGNOSIS — D72.89 ATYPICAL LYMPHOCYTES PRESENT ON PERIPHERAL BLOOD SMEAR: ICD-10-CM

## 2024-02-10 DIAGNOSIS — D47.1 MYELOPROLIFERATIVE DISORDER (HCC): ICD-10-CM

## 2024-02-10 DIAGNOSIS — D72.829 LEUKOCYTOSIS, UNSPECIFIED TYPE: ICD-10-CM

## 2024-02-10 LAB
ERYTHROCYTE [DISTWIDTH] IN BLOOD BY AUTOMATED COUNT: 13.5 % (ref 11.6–15.1)
HCT VFR BLD AUTO: 38.6 % (ref 34.8–46.1)
HGB BLD-MCNC: 12.3 G/DL (ref 11.5–15.4)
IMM EOSINOPHIL NFR BLD MANUAL: 3 % (ref 0–6)
LYMPHOCYTES NFR BLD: 39 % (ref 14–44)
MCH RBC QN AUTO: 28.6 PG (ref 26.8–34.3)
MCHC RBC AUTO-ENTMCNC: 31.9 G/DL (ref 31.4–37.4)
MCV RBC AUTO: 90 FL (ref 82–98)
MONOCYTES NFR BLD AUTO: 3 % (ref 4–12)
NEUTS BAND NFR BLD MANUAL: 13 THOUSAND/UL
NEUTS SEG NFR BLD AUTO: 42 % (ref 45–77)
NRBC BLD AUTO-RTO: 0 /100 WBCS
PLATELET # BLD AUTO: 313 THOUSANDS/UL (ref 149–390)
PLATELET BLD QL SMEAR: ADEQUATE
PMV BLD AUTO: 11.3 FL (ref 8.9–12.7)
RBC # BLD AUTO: 4.3 MILLION/UL (ref 3.81–5.12)
RBC MORPH BLD: NORMAL
TOTAL CELLS COUNTED SPEC: 100
WBC # BLD AUTO: 8.76 THOUSAND/UL (ref 4.31–10.16)

## 2024-02-10 PROCEDURE — 85007 BL SMEAR W/DIFF WBC COUNT: CPT

## 2024-03-04 ENCOUNTER — TELEPHONE (OUTPATIENT)
Age: 49
End: 2024-03-04

## 2024-03-04 NOTE — TELEPHONE ENCOUNTER
Des from [employer] called to see if a Medical Records Request was received from 2/29. I did not see anything. He will refax. Please, look out for papers.

## 2024-03-06 NOTE — TELEPHONE ENCOUNTER
Des called again from [facility] called again. I did not see that paperwork was received and I confirmed the office fax number with him.

## 2024-03-07 NOTE — TELEPHONE ENCOUNTER
Des called again about this. He is refaxing. Can you call him to let him know when it is received at [phone], Att: [phone]. He will refax it three times today.

## 2024-03-14 ENCOUNTER — OFFICE VISIT (OUTPATIENT)
Dept: DERMATOLOGY | Facility: CLINIC | Age: 49
End: 2024-03-14
Payer: COMMERCIAL

## 2024-03-14 VITALS — BODY MASS INDEX: 34.93 KG/M2 | TEMPERATURE: 98.6 F | WEIGHT: 185 LBS | HEIGHT: 61 IN

## 2024-03-14 DIAGNOSIS — D22.71 MULTIPLE BENIGN MELANOCYTIC NEVI OF UPPER AND LOWER EXTREMITIES AND TRUNK: ICD-10-CM

## 2024-03-14 DIAGNOSIS — D22.5 MULTIPLE BENIGN MELANOCYTIC NEVI OF UPPER AND LOWER EXTREMITIES AND TRUNK: ICD-10-CM

## 2024-03-14 DIAGNOSIS — D22.61 MULTIPLE BENIGN MELANOCYTIC NEVI OF UPPER AND LOWER EXTREMITIES AND TRUNK: ICD-10-CM

## 2024-03-14 DIAGNOSIS — D22.72 MULTIPLE BENIGN MELANOCYTIC NEVI OF UPPER AND LOWER EXTREMITIES AND TRUNK: ICD-10-CM

## 2024-03-14 DIAGNOSIS — D18.01 CHERRY ANGIOMA: ICD-10-CM

## 2024-03-14 DIAGNOSIS — L21.9 SEBORRHEIC DERMATITIS: ICD-10-CM

## 2024-03-14 DIAGNOSIS — D22.62 MULTIPLE BENIGN MELANOCYTIC NEVI OF UPPER AND LOWER EXTREMITIES AND TRUNK: ICD-10-CM

## 2024-03-14 DIAGNOSIS — L81.4 SOLAR LENTIGO: Primary | ICD-10-CM

## 2024-03-14 PROCEDURE — 99214 OFFICE O/P EST MOD 30 MIN: CPT | Performed by: NURSE PRACTITIONER

## 2024-03-14 NOTE — PATIENT INSTRUCTIONS
"SEBORRHEIC DERMATITIS    Assessment and Plan:  Based on a thorough discussion of this condition and the management approach to it (including a comprehensive discussion of the known risks, side effects and potential benefits of treatment), the patient (family) agrees to implement the following specific plan:  Ketoconazole shampoo discussed   Advised to contact office if becomes bothersome      SOLAR LENTIGINES   OTHER SKIN CHANGES DUE TO CHRONIC EXPOSURE TO NONIONIZING RADIATION     Assessment and Plan:  Based on a thorough discussion of this condition and the management approach to it (including a comprehensive discussion of the known risks, side effects and potential benefits of treatment), the patient (family) agrees to implement the following specific plan:  Monitor for changes   Use sun protection.  Apply SPF 30 or higher at least three times a day.  Wear sun protecting clothing and hats.         MULTIPLE MELANOCYTIC NEVI (\"Moles\")     Assessment and Plan:  Based on a thorough discussion of this condition and the management approach to it (including a comprehensive discussion of the known risks, side effects and potential benefits of treatment), the patient (family) agrees to implement the following specific plan:  Monitor for changes  Use sun protection.  Apply SPF 30 or higher at least three times a day.  Wear sun protecting clothing and hats.       Worrisome signs of skin malignancy discussed, questions answered. Regular self-skin check discussed. Advised to call or return to office if patient notices any spots of concern, rapidly growing/changing lesions, bleeding lesions, non-healing lesions. Advised regular SPF use.    KNUTSON ANGIOMAS       Assessment and Plan:  Based on a thorough discussion of this condition and the management approach to it (including a comprehensive discussion of the known risks, side effects and potential benefits of treatment), the patient (family) agrees to implement the following " specific plan:  Reassured benign

## 2024-03-14 NOTE — PROGRESS NOTES
"[facility] Dermatology Clinic Note     Patient Name: Kiesha Hannon  Encounter Date: 03/14/2024     Have you been cared for by a [facility] Dermatologist in the last 3 years and, if so, which description applies to you?    Yes.  I have been here within the last 3 years, and my medical history has NOT changed since that time.  I am FEMALE/of child-bearing potential.    REVIEW OF SYSTEMS:  Have you recently had or currently have any of the following? No changes in my recent health.   PAST MEDICAL HISTORY:  Have you personally ever had or currently have any of the following?  If \"YES,\" then please provide more detail. No changes in my medical history.   HISTORY OF IMMUNOSUPPRESSION: Do you have a history of any of the following:  Systemic Immunosuppression such as Diabetes, Biologic or Immunotherapy, Chemotherapy, Organ Transplantation, Bone Marrow Transplantation?  YES, diabetes      Answering \"YES\" requires the addition of the dotphrase \"IMMUNOSUPPRESSED\" as the first diagnosis of the patient's visit.   FAMILY HISTORY:  Any \"first degree relatives\" (parent, brother, sister, or child) with the following?    No changes in my family's known health.   PATIENT EXPERIENCE:    Do you want the Dermatologist to perform a COMPLETE skin exam today including a clinical examination under the \"bra and underwear\" areas?  Yes  If necessary, do we have your permission to call and leave a detailed message on your Preferred Phone number that includes your specific medical information?  Yes      No Known Allergies   Current Outpatient Medications:     BD Pen Needle Shellie U/F 32G X 4 MM MISC, , Disp: , Rfl:     BD ULTRA-FINE LANCETS lancets, USE TO INJECT INSULIN ONCE DAILY AS DIRECTED (FURTHER REFILLS TO BE GIVEN AT UPCOMING APPOINTMENT), Disp: , Rfl:     Blood Glucose Monitoring Suppl (OneTouch Verio Flex System) w/Device KIT, USE AS DIRECTED. ONE TOUCH VERIO, Disp: , Rfl:     Continuous Blood Gluc Sensor (FreeStyle Jaxson 2 Sensor) " MISC, , Disp: , Rfl:     Insulin Glargine-Lixisenatide (Insulin Glargine-Lixisenatide) 100 units-33 mcg/mL injection pen, Inject 50 Units under the skin daily, Disp: , Rfl:     Lancets 33G MISC, Use to test glucoses BID. One Touch Delica., Disp: , Rfl:     metFORMIN (GLUCOPHAGE) 1000 MG tablet, TAKE 1 TABLET TWICE A DAY WITH MEALS, Disp: 180 tablet, Rfl: 1    Misc Natural Products (BLOOD SUGAR 360 PO), One touch verio strips Testing BID, Disp: , Rfl:     OneTouch Verio test strip, , Disp: , Rfl:     rosuvastatin (CRESTOR) 5 mg tablet, TAKE 1 TABLET BY MOUTH EVERY DAY, Disp: 90 tablet, Rfl: 3          Whom besides the patient is providing clinical information about today's encounter?   NO ADDITIONAL HISTORIAN (patient alone provided history)    Physical Exam and Assessment/Plan by Diagnosis:  SEBORRHEIC DERMATITIS    Physical Exam:  Anatomic Location Affected:  forehead, nasal folds  Morphological Description:  pink patches with mild flake  Pertinent Positives:  Pertinent Negatives:    Additional History of Present Condition:  noted on exam, patient asymptomatic    Assessment and Plan:  Based on a thorough discussion of this condition and the management approach to it (including a comprehensive discussion of the known risks, side effects and potential benefits of treatment), the patient (family) agrees to implement the following specific plan:  Ketoconazole shampoo discussed, defers at this time  Advised to contact office if becomes bothersome       SOLAR LENTIGINES   OTHER SKIN CHANGES DUE TO CHRONIC EXPOSURE TO NONIONIZING RADIATION     Physical Exam:  Anatomic Location Affected:  Sun exposed areas of back, chest, arms, legs  Morphological Description:  Multiple scattered brown to tan evenly pigmented macules   Denies pain, itch, bleeding. No treatments tried. Present for months - years. Reports getting newer lesions with sun exposure.         Assessment and Plan:  Based on a thorough discussion of this condition  "and the management approach to it (including a comprehensive discussion of the known risks, side effects and potential benefits of treatment), the patient (family) agrees to implement the following specific plan:  Monitor for changes   Use sun protection.  Apply SPF 30 or higher at least three times a day.  Wear sun protecting clothing and hats.         MULTIPLE MELANOCYTIC NEVI (\"Moles\")     Physical Exam:  Anatomic Location Affected: Trunk and extremities  Morphological Description:  Scattered, round to ovoid, symmetrical-appearing, even bordered, skin colored to dark brown macules/papules  Denies pain, itch, bleeding. No treatments tried. Present for years. Present constantly; no modifying factors which make it worse or better. Denies actively changing or growing moles.      Assessment and Plan:  Based on a thorough discussion of this condition and the management approach to it (including a comprehensive discussion of the known risks, side effects and potential benefits of treatment), the patient (family) agrees to implement the following specific plan:  Monitor for changes  Use sun protection.  Apply SPF 30 or higher at least three times a day.  Wear sun protecting clothing and hats.  Continue skin checks       Worrisome signs of skin malignancy discussed, questions answered. Regular self-skin check discussed. Advised to call or return to office if patient notices any spots of concern, rapidly growing/changing lesions, bleeding lesions, non-healing lesions. Advised regular SPF use.      CHERRY ANGIOMAS     Physical Exam:  Anatomic Location Affected:  Trunk and extremities  Morphological Description:  Scattered cherry red papules  Denies pain, itch, bleeding. No treatments tried. Present for years. Present constantly; no modifying factors which make it worse or better.     Assessment and Plan:  Based on a thorough discussion of this condition and the management approach to it (including a comprehensive discussion " of the known risks, side effects and potential benefits of treatment), the patient (family) agrees to implement the following specific plan:  Reassured benign    Scribe Attestation      I,:  Elva Don am acting as a scribe while in the presence of the attending physician.:       I,:  TERESO Kaufman personally performed the services described in this documentation    as scribed in my presence.:

## 2024-03-26 DIAGNOSIS — E11.9 TYPE 2 DIABETES MELLITUS WITHOUT COMPLICATION, WITH LONG-TERM CURRENT USE OF INSULIN (HCC): ICD-10-CM

## 2024-03-26 DIAGNOSIS — Z79.4 TYPE 2 DIABETES MELLITUS WITHOUT COMPLICATION, WITH LONG-TERM CURRENT USE OF INSULIN (HCC): ICD-10-CM

## 2024-04-25 DIAGNOSIS — Z79.4 TYPE 2 DIABETES MELLITUS WITHOUT COMPLICATION, WITH LONG-TERM CURRENT USE OF INSULIN (HCC): ICD-10-CM

## 2024-04-25 DIAGNOSIS — E11.9 TYPE 2 DIABETES MELLITUS WITHOUT COMPLICATION, WITH LONG-TERM CURRENT USE OF INSULIN (HCC): ICD-10-CM

## 2024-04-26 RX ORDER — ROSUVASTATIN CALCIUM 5 MG/1
TABLET, COATED ORAL
Qty: 90 TABLET | Refills: 3 | Status: SHIPPED | OUTPATIENT
Start: 2024-04-26

## 2024-05-08 ENCOUNTER — RA CDI HCC (OUTPATIENT)
Dept: OTHER | Facility: HOSPITAL | Age: 49
End: 2024-05-08

## 2024-05-08 NOTE — PROGRESS NOTES
HCC coding opportunities          Chart Reviewed number of suggestions sent to Provider: 1     Patients Insurance        Commercial Insurance: [employer]     E11.319

## 2024-05-15 ENCOUNTER — TELEPHONE (OUTPATIENT)
Dept: FAMILY MEDICINE CLINIC | Facility: CLINIC | Age: 49
End: 2024-05-15

## 2024-05-15 ENCOUNTER — OFFICE VISIT (OUTPATIENT)
Dept: FAMILY MEDICINE CLINIC | Facility: CLINIC | Age: 49
End: 2024-05-15

## 2024-05-15 VITALS
HEIGHT: 61 IN | TEMPERATURE: 97.2 F | HEART RATE: 85 BPM | BODY MASS INDEX: 35.12 KG/M2 | DIASTOLIC BLOOD PRESSURE: 62 MMHG | WEIGHT: 186 LBS | SYSTOLIC BLOOD PRESSURE: 118 MMHG | OXYGEN SATURATION: 99 %

## 2024-05-15 DIAGNOSIS — E78.2 MIXED HYPERLIPIDEMIA: ICD-10-CM

## 2024-05-15 DIAGNOSIS — D47.1 MYELOPROLIFERATIVE DISORDER (HCC): ICD-10-CM

## 2024-05-15 DIAGNOSIS — E11.9 TYPE 2 DIABETES MELLITUS WITHOUT COMPLICATION, WITH LONG-TERM CURRENT USE OF INSULIN (HCC): ICD-10-CM

## 2024-05-15 DIAGNOSIS — Z79.4 TYPE 2 DIABETES MELLITUS WITHOUT COMPLICATION, WITH LONG-TERM CURRENT USE OF INSULIN (HCC): ICD-10-CM

## 2024-05-15 DIAGNOSIS — Z00.00 ANNUAL PHYSICAL EXAM: Primary | ICD-10-CM

## 2024-05-15 DIAGNOSIS — E66.01 CLASS 2 SEVERE OBESITY DUE TO EXCESS CALORIES WITH SERIOUS COMORBIDITY AND BODY MASS INDEX (BMI) OF 35.0 TO 35.9 IN ADULT (HCC): ICD-10-CM

## 2024-05-15 DIAGNOSIS — R19.8 IRREGULAR BOWEL HABITS: ICD-10-CM

## 2024-05-15 NOTE — PROGRESS NOTES
Adult Annual Physical  Name: Kiesha Hannon      : 1975      MRN: [id number]  Encounter Provider: Venecia Hernandez MD  Encounter Date: 5/15/2024   Encounter department: [facility]    Assessment & Plan   1. Annual physical exam  2. Type 2 diabetes mellitus without complication, with long-term current use of insulin (HCC)  Assessment & Plan:    Lab Results   Component Value Date    HGBA1C 6.8 (H) 02/10/2024   Chronic, improved  Followed by endo  Continue on metformin and insulin  UTD  eye exam, foot exam done today   Orders:  -     Lipid Panel with Direct LDL reflex; Future  -     Hemoglobin A1C; Future  -     Comprehensive metabolic panel; Future  -     CBC; Future; Expected date: 05/15/2024  -     Albumin / creatinine urine ratio; Future; Expected date: 05/15/2024  -     UA (URINE) with reflex to Scope; Future  -     TSH, 3rd generation with Free T4 reflex; Future; Expected date: 05/15/2024  3. Myeloproliferative disorder (HCC)  Assessment & Plan:  Chronic, managed by heme  Patient went for labs - no follow up scheduled. I will touch base with heme to see if follow up is needed   4. Class 2 severe obesity due to excess calories with serious comorbidity and body mass index (BMI) of 35.0 to 35.9 in adult ([facility])  5. Mixed hyperlipidemia  Assessment & Plan:  Chronic, stable  Continue on rosuvastatin 5mg qd   6. Irregular bowel habits  Assessment & Plan:  Continue to focus on a balanced diet  Add metamucil daily   Update me in 2 weeks   May need to return to GI depending on symptom response     Immunizations and preventive care screenings were discussed with patient today. Appropriate education was printed on patient's after visit summary.    Counseling:  Alcohol/drug use: discussed moderation in alcohol intake, the recommendations for healthy alcohol use, and avoidance of illicit drug use.  Dental Health: discussed importance of regular tooth brushing, flossing, and dental  visits.  Exercise: the importance of regular exercise/physical activity was discussed. Recommend exercise 3-5 times per week for at least 30 minutes.       Depression Screening and Follow-up Plan: Patient was screened for depression during today's encounter. They screened negative with a PHQ-2 score of 0.        History of Present Illness     Adult Annual Physical:  Patient presents for annual physical.     Diet and Physical Activity:  - Diet/Nutrition: well balanced diet.  - Exercise: walking.    Depression Screening:  - PHQ-2 Score: 0    General Health:  - Sleep: sleeps well.  - Hearing: normal hearing bilateral ears.  - Vision: goes for regular eye exams and wears glasses.  - Dental: regular dental visits.    /GYN Health:  - Follows with GYN: yes.   - Last menstrual cycle: 4/20/2024.   - History of STDs: no      BM changes started 3 weeks ago. She can't recall a specific trigger. She reports louder sounds of gurgling. She also reports irregular BM, looser and sometimes diarrhea. No mucus or blood. Stools are brown. No nausea, reflux, burping.   She's been trying to drink more water, she's cut down to one diet soda a day. She's eating berries (eating more than before), and eating same amount of squash and broccoli. She has been trying to cut down on her carbs.     Review of Systems   Constitutional:  Negative for activity change, chills and fever.   HENT:  Negative for congestion, rhinorrhea and sore throat.    Eyes:  Negative for visual disturbance.   Respiratory:  Negative for cough, shortness of breath and wheezing.    Cardiovascular:  Negative for chest pain and palpitations.   Gastrointestinal:  Positive for abdominal distention and diarrhea. Negative for abdominal pain, blood in stool, constipation, nausea and vomiting.   Genitourinary:  Negative for dysuria.   Musculoskeletal:  Negative for arthralgias and myalgias.   Skin:  Negative for rash.   Neurological:  Negative for dizziness, weakness and  "headaches.   All other systems reviewed and are negative.    Medical History Reviewed by provider this encounter:  No text in SmartText       Current Outpatient Medications on File Prior to Visit   Medication Sig Dispense Refill   • BD Pen Needle Shellie U/F 32G X 4 MM MISC      • BD ULTRA-FINE LANCETS lancets USE TO INJECT INSULIN ONCE DAILY AS DIRECTED (FURTHER REFILLS TO BE GIVEN AT UPCOMING APPOINTMENT)     • Blood Glucose Monitoring Suppl (OneTouch Verio Flex System) w/Device KIT USE AS DIRECTED. ONE TOUCH VERIO     • Continuous Blood Gluc Sensor (FreeStyle Jaxson 2 Sensor) MISC      • Insulin Glargine-Lixisenatide (Insulin Glargine-Lixisenatide) 100 units-33 mcg/mL injection pen Inject 50 Units under the skin daily     • Lancets 33G MISC Use to test glucoses BID. One Touch Delica.     • metFORMIN (GLUCOPHAGE) 1000 MG tablet TAKE 1 TABLET TWICE A DAY WITH MEALS 180 tablet 1   • Misc Natural Products (BLOOD SUGAR 360 PO) One touch verio strips Testing BID     • OneTouch Verio test strip      • rosuvastatin (CRESTOR) 5 mg tablet TAKE 1 TABLET BY MOUTH EVERY DAY 90 tablet 3     No current facility-administered medications on file prior to visit.      Social History     Tobacco Use   • Smoking status: Never     Passive exposure: Never   • Smokeless tobacco: Never   Vaping Use   • Vaping status: Never Used   Substance and Sexual Activity   • Alcohol use: Yes     Comment: Only occasionally or socially   • Drug use: Never   • Sexual activity: Yes     Partners: Male     Birth control/protection: Female Sterilization       Objective     /62   Pulse 85   Temp (!) 97.2 °F (36.2 °C)   Ht 5' 1\" (1.549 m)   Wt 84.4 kg (186 lb)   LMP 04/20/2024 (Exact Date)   SpO2 99%   BMI 35.14 kg/m²     Physical Exam  Vitals and nursing note reviewed.   Constitutional:       General: She is not in acute distress.     Appearance: She is well-developed. She is obese. She is not ill-appearing.   HENT:      Head: Normocephalic and " atraumatic.      Right Ear: Tympanic membrane, ear canal and external ear normal. No middle ear effusion.      Left Ear: Tympanic membrane, ear canal and external ear normal.  No middle ear effusion.      Nose: Nose normal. No congestion or rhinorrhea.      Mouth/Throat:      Lips: Pink.      Mouth: Mucous membranes are moist.      Pharynx: Oropharynx is clear. Uvula midline. No oropharyngeal exudate.      Tonsils: No tonsillar exudate.   Eyes:      General: Lids are normal.      Extraocular Movements: Extraocular movements intact.      Conjunctiva/sclera: Conjunctivae normal.      Pupils: Pupils are equal, round, and reactive to light.   Neck:      Thyroid: No thyromegaly.      Trachea: No tracheal deviation.   Cardiovascular:      Rate and Rhythm: Normal rate and regular rhythm.      Pulses: Normal pulses. no weak pulses.           Dorsalis pedis pulses are 2+ on the right side and 2+ on the left side.        Posterior tibial pulses are 2+ on the right side and 2+ on the left side.      Heart sounds: Normal heart sounds, S1 normal and S2 normal. No murmur heard.  Pulmonary:      Effort: Pulmonary effort is normal. No respiratory distress.      Breath sounds: Normal breath sounds. No decreased breath sounds, wheezing, rhonchi or rales.   Abdominal:      General: Bowel sounds are normal. There is no distension.      Palpations: Abdomen is soft.      Tenderness: There is no abdominal tenderness.   Musculoskeletal:      Right lower leg: No edema.      Left lower leg: No edema.   Feet:      Right foot:      Skin integrity: No ulcer, skin breakdown, erythema, warmth, callus or dry skin.      Left foot:      Skin integrity: No ulcer, skin breakdown, erythema, warmth, callus or dry skin.   Lymphadenopathy:      Cervical: No cervical adenopathy.   Skin:     General: Skin is warm and dry.      Capillary Refill: Capillary refill takes less than 2 seconds.   Neurological:      Mental Status: She is alert and oriented to  person, place, and time.      Deep Tendon Reflexes: Reflexes normal.      Reflex Scores:       Patellar reflexes are 2+ on the right side and 2+ on the left side.  Psychiatric:         Attention and Perception: Attention normal.         Mood and Affect: Mood normal.         Thought Content: Thought content does not include suicidal ideation.     Diabetic Foot Exam    Patient's shoes and socks removed.    Right Foot/Ankle   Right Foot Inspection  Skin Exam: skin normal and skin intact. No dry skin, no warmth, no callus, no erythema, no maceration, no abnormal color, no pre-ulcer, no ulcer and no callus.     Toe Exam: ROM and strength within normal limits.     Sensory   Vibration: intact  Monofilament testing: intact    Vascular  Capillary refills: < 3 seconds  The right DP pulse is 2+. The right PT pulse is 2+.     Left Foot/Ankle  Left Foot Inspection  Skin Exam: skin normal and skin intact. No dry skin, no warmth, no erythema, no maceration, normal color, no pre-ulcer, no ulcer and no callus.     Toe Exam: ROM and strength within normal limits.     Sensory   Vibration: intact  Monofilament testing: intact    Vascular  Capillary refills: < 3 seconds  The left DP pulse is 2+. The left PT pulse is 2+.     Assign Risk Category  No deformity present  No loss of protective sensation  No weak pulses  Risk: 0    Administrative Statements

## 2024-05-15 NOTE — TELEPHONE ENCOUNTER
----- Message from Trino Bob MD sent at 5/15/2024  9:22 AM EDT -----  Regarding: RE: sophie victor no longer works here.    I would have have the patient return to hematology if the white blood cell count is 12,000 or higher, consistently.  ----- Message -----  From: Venecia Hernandez MD  Sent: 5/15/2024   9:00 AM EDT  To: TERESO Felix  Subject: mutual                                           Good morning,    Our mutual patient has not followed up consistently with you. She did repeat labs for you in February. Do you feel she needs to be seen for an office visit? Let me know if I can help,    Katie Jang MD  Family Medicine

## 2024-05-15 NOTE — ASSESSMENT & PLAN NOTE
Continue to focus on a balanced diet  Add metamucil daily   Update me in 2 weeks   May need to return to GI depending on symptom response

## 2024-05-15 NOTE — ASSESSMENT & PLAN NOTE
Lab Results   Component Value Date    HGBA1C 6.8 (H) 02/10/2024   Chronic, improved  Followed by endo  Continue on metformin and insulin  UTD  eye exam, foot exam done today

## 2024-05-15 NOTE — TELEPHONE ENCOUNTER
Please call patient - let her know I checked with hematology and they feel her labs are stable and she doesn't need to follow up with them unless something changes. I will continue to monitor.

## 2024-05-15 NOTE — ASSESSMENT & PLAN NOTE
Chronic, managed by laura  Patient went for labs - no follow up scheduled. I will touch base with laura to see if follow up is needed

## 2024-06-05 ENCOUNTER — PATIENT MESSAGE (OUTPATIENT)
Dept: FAMILY MEDICINE CLINIC | Facility: CLINIC | Age: 49
End: 2024-06-05

## 2024-06-05 DIAGNOSIS — R19.8 IRREGULAR BOWEL HABITS: Primary | ICD-10-CM

## 2024-08-03 ENCOUNTER — APPOINTMENT (OUTPATIENT)
Dept: LAB | Facility: MEDICAL CENTER | Age: 49
End: 2024-08-03
Payer: COMMERCIAL

## 2024-08-03 DIAGNOSIS — E78.2 MIXED HYPERLIPIDEMIA: ICD-10-CM

## 2024-08-03 DIAGNOSIS — Z79.4 TYPE 2 DIABETES MELLITUS WITHOUT COMPLICATION, WITH LONG-TERM CURRENT USE OF INSULIN (HCC): ICD-10-CM

## 2024-08-03 DIAGNOSIS — E11.9 TYPE 2 DIABETES MELLITUS WITHOUT COMPLICATION, WITH LONG-TERM CURRENT USE OF INSULIN (HCC): ICD-10-CM

## 2024-08-03 DIAGNOSIS — Z79.4 ENCOUNTER FOR LONG-TERM (CURRENT) USE OF INSULIN (HCC): ICD-10-CM

## 2024-08-03 DIAGNOSIS — E66.01 CLASS 2 SEVERE OBESITY DUE TO EXCESS CALORIES WITH SERIOUS COMORBIDITY AND BODY MASS INDEX (BMI) OF 38.0 TO 38.9 IN ADULT (HCC): ICD-10-CM

## 2024-08-03 LAB
25(OH)D3 SERPL-MCNC: 28.3 NG/ML (ref 30–100)
ALBUMIN SERPL BCG-MCNC: 4.2 G/DL (ref 3.5–5)
ALP SERPL-CCNC: 54 U/L (ref 34–104)
ALT SERPL W P-5'-P-CCNC: 26 U/L (ref 7–52)
ANION GAP SERPL CALCULATED.3IONS-SCNC: 10 MMOL/L (ref 4–13)
AST SERPL W P-5'-P-CCNC: 21 U/L (ref 13–39)
BILIRUB SERPL-MCNC: 0.36 MG/DL (ref 0.2–1)
BILIRUB UR QL STRIP: NEGATIVE
BUN SERPL-MCNC: 10 MG/DL (ref 5–25)
CALCIUM SERPL-MCNC: 9.1 MG/DL (ref 8.4–10.2)
CHLORIDE SERPL-SCNC: 102 MMOL/L (ref 96–108)
CHOLEST SERPL-MCNC: 108 MG/DL
CLARITY UR: CLEAR
CO2 SERPL-SCNC: 26 MMOL/L (ref 21–32)
COLOR UR: COLORLESS
CREAT SERPL-MCNC: 0.52 MG/DL (ref 0.6–1.3)
CREAT UR-MCNC: 35.1 MG/DL
ERYTHROCYTE [DISTWIDTH] IN BLOOD BY AUTOMATED COUNT: 13.7 % (ref 11.6–15.1)
EST. AVERAGE GLUCOSE BLD GHB EST-MCNC: 148 MG/DL
GFR SERPL CREATININE-BSD FRML MDRD: 112 ML/MIN/1.73SQ M
GLUCOSE P FAST SERPL-MCNC: 119 MG/DL (ref 65–99)
GLUCOSE UR STRIP-MCNC: NEGATIVE MG/DL
HBA1C MFR BLD: 6.8 %
HCT VFR BLD AUTO: 38.8 % (ref 34.8–46.1)
HDLC SERPL-MCNC: 46 MG/DL
HGB BLD-MCNC: 12.2 G/DL (ref 11.5–15.4)
HGB UR QL STRIP.AUTO: NEGATIVE
KETONES UR STRIP-MCNC: NEGATIVE MG/DL
LDLC SERPL CALC-MCNC: 43 MG/DL (ref 0–100)
LEUKOCYTE ESTERASE UR QL STRIP: NEGATIVE
MCH RBC QN AUTO: 28.6 PG (ref 26.8–34.3)
MCHC RBC AUTO-ENTMCNC: 31.4 G/DL (ref 31.4–37.4)
MCV RBC AUTO: 91 FL (ref 82–98)
MICROALBUMIN UR-MCNC: <7 MG/L
NITRITE UR QL STRIP: NEGATIVE
PH UR STRIP.AUTO: 6 [PH]
PLATELET # BLD AUTO: 363 THOUSANDS/UL (ref 149–390)
PMV BLD AUTO: 10.7 FL (ref 8.9–12.7)
POTASSIUM SERPL-SCNC: 4.1 MMOL/L (ref 3.5–5.3)
PROT SERPL-MCNC: 7 G/DL (ref 6.4–8.4)
PROT UR STRIP-MCNC: NEGATIVE MG/DL
RBC # BLD AUTO: 4.26 MILLION/UL (ref 3.81–5.12)
SODIUM SERPL-SCNC: 138 MMOL/L (ref 135–147)
SP GR UR STRIP.AUTO: 1.01 (ref 1–1.03)
TRIGL SERPL-MCNC: 96 MG/DL
TSH SERPL DL<=0.05 MIU/L-ACNC: 1.79 UIU/ML (ref 0.45–4.5)
UROBILINOGEN UR STRIP-ACNC: <2 MG/DL
WBC # BLD AUTO: 10 THOUSAND/UL (ref 4.31–10.16)

## 2024-08-03 PROCEDURE — 82306 VITAMIN D 25 HYDROXY: CPT

## 2024-08-03 PROCEDURE — 82043 UR ALBUMIN QUANTITATIVE: CPT

## 2024-08-03 PROCEDURE — 36415 COLL VENOUS BLD VENIPUNCTURE: CPT

## 2024-08-03 PROCEDURE — 81003 URINALYSIS AUTO W/O SCOPE: CPT

## 2024-08-03 PROCEDURE — 80061 LIPID PANEL: CPT

## 2024-08-03 PROCEDURE — 82570 ASSAY OF URINE CREATININE: CPT

## 2024-08-03 PROCEDURE — 85027 COMPLETE CBC AUTOMATED: CPT

## 2024-08-03 PROCEDURE — 84443 ASSAY THYROID STIM HORMONE: CPT

## 2024-08-03 PROCEDURE — 83036 HEMOGLOBIN GLYCOSYLATED A1C: CPT

## 2024-08-03 PROCEDURE — 80053 COMPREHEN METABOLIC PANEL: CPT

## 2024-08-06 ENCOUNTER — PATIENT MESSAGE (OUTPATIENT)
Dept: OBGYN CLINIC | Facility: MEDICAL CENTER | Age: 49
End: 2024-08-06

## 2024-08-06 DIAGNOSIS — R92.30 DENSE BREAST TISSUE ON MAMMOGRAM, UNSPECIFIED TYPE: Primary | ICD-10-CM

## 2024-08-06 DIAGNOSIS — Z12.31 SCREENING MAMMOGRAM FOR BREAST CANCER: ICD-10-CM

## 2024-09-11 ENCOUNTER — OFFICE VISIT (OUTPATIENT)
Dept: GASTROENTEROLOGY | Facility: AMBULARY SURGERY CENTER | Age: 49
End: 2024-09-11
Payer: COMMERCIAL

## 2024-09-11 VITALS
OXYGEN SATURATION: 99 % | SYSTOLIC BLOOD PRESSURE: 128 MMHG | BODY MASS INDEX: 35.53 KG/M2 | WEIGHT: 188.2 LBS | HEIGHT: 61 IN | HEART RATE: 78 BPM | DIASTOLIC BLOOD PRESSURE: 64 MMHG

## 2024-09-11 DIAGNOSIS — R19.8 IRREGULAR BOWEL HABITS: ICD-10-CM

## 2024-09-11 DIAGNOSIS — K52.9 CHRONIC DIARRHEA: Primary | ICD-10-CM

## 2024-09-11 PROCEDURE — 99213 OFFICE O/P EST LOW 20 MIN: CPT | Performed by: PHYSICIAN ASSISTANT

## 2024-09-11 RX ORDER — DICYCLOMINE HCL 20 MG
20 TABLET ORAL 2 TIMES DAILY PRN
Qty: 30 TABLET | Refills: 3 | Status: SHIPPED | OUTPATIENT
Start: 2024-09-11

## 2024-09-11 NOTE — PROGRESS NOTES
[facility] - Outpatient Consultation  Kiesha Hannon 48 y.o. female MRN: [id number]  Encounter: [id number]          ASSESSMENT AND PLAN:      48-year-old female with history of type 2 diabetes who presents the office as a new patient for irregular bowel habits.    Irregular bowel habits, loose stools, increased borborygmi  Patient reports more irregular bowel habits recently.  Reports sometimes skipping days between bowel movements vs more looser stools in the evening of varying sizes. Increased borborygmi. No significant abdominal pain. Recent TSH, Hgb normal, colonoscopy in 2020.    -Recommend switching to fiber powder such as Metamucil or Benefiber 1 tablespoon daily.  - Recommend the use of Bentyl as needed for prior to known food triggers.  - Recommend trying to isolate specific food triggers.  For example, could try decrease broccoli intake as she reports eating this a lot to see if improvement.  -Will check celiac testing and fecal calprotectin.  -Consider trial of decreasing coffee intake    -Plan for close follow-up, if no improvement recommend repeat colonoscopy.      Patient was recommended to follow up in 2 months. Patient was recommended to reach out via [employer] with any questions or concerns in the meantime.   ______________________________________________________________________    HPI:      Kiesha Hannon is a 48 y.o. female with type 2 diabetes, myeloproliferative disorder who presents the office as a new patient for irregular bowel habits.    Patient reports over the past several months noticing some more looser stools.  She reports this happens a few times a week.  She is unsure when the last episode occurred.  She reports increased borborygmi.  She reports sometimes she will have normal bowel movement followed by more looser stools.  She reports sometimes needing to go a few times in order to feel completely clear now.  No blood in stool.  Weight is stable.  She  reports eating a lot of broccoli.  She reports increasing coffee intake to 2 cups daily.  She started a fiber gummy however only taking 2 daily.    Most recent lab work last month with normal hemoglobin, TSH, liver enzymes.    No family history of colon cancer.    Patient had colonoscopy 2020 with few polyps removed and repeat recommended in 5 years.    REVIEW OF SYSTEMS:    CONSTITUTIONAL: Denies any fever, chills, rigors, and weight loss.  HEENT: No earache or tinnitus. Denies hearing loss or visual disturbances.  CARDIOVASCULAR: No chest pain or palpitations.   RESPIRATORY: Denies any cough, hemoptysis, shortness of breath or dyspnea on exertion.  GASTROINTESTINAL: As noted in the History of Present Illness.   GENITOURINARY: No problems with urination. Denies any hematuria or dysuria.  NEUROLOGIC: No dizziness or vertigo, denies headaches.   MUSCULOSKELETAL: Denies any muscle or joint pain.   SKIN: Denies skin rashes or itching.   ENDOCRINE: Denies excessive thirst. Denies intolerance to heat or cold.  PSYCHOSOCIAL: Denies depression or anxiety. Denies any recent memory loss.       Historical Information   Past Medical History:   Diagnosis Date    Breast lump     last assessed: 13    COVID-19 2022    Diabetes mellitus (HCC)     Diabetes mellitus type II, controlled (HCC)     Eustachian tube dysfunction     last assessed: 12/14/15    Hx of tubal ligation     Miscarriage     Vagina, candidiasis     last assessed: 13     Past Surgical History:   Procedure Laterality Date    BREAST BIOPSY Right     MRI Breast BX 2011     SECTION      x3    TUBAL LIGATION  2012    WISDOM TOOTH EXTRACTION       Social History   Social History     Substance and Sexual Activity   Alcohol Use Yes    Comment: Only occasionally or socially     Social History     Substance and Sexual Activity   Drug Use Never     Social History     Tobacco Use   Smoking Status Never    Passive exposure: Never  "  Smokeless Tobacco Never     Family History   Problem Relation Age of Onset    Arthritis Mother     Diabetes Mother     No Known Problems Father     No Known Problems Sister     Diabetes Maternal Grandmother     Colon cancer Paternal Grandfather     Heart disease Family     Hypertension Family     Breast cancer Neg Hx     Ovarian cancer Neg Hx        Meds/Allergies       Current Outpatient Medications:     BD Pen Needle Shellie U/F 32G X 4 MM MISC    BD ULTRA-FINE LANCETS lancets    Blood Glucose Monitoring Suppl (OneTouch Verio Flex System) w/Device KIT    Continuous Blood Gluc Sensor (FreeStyle Jaxson 2 Sensor) MISC    dicyclomine (BENTYL) 20 mg tablet    Insulin Glargine-Lixisenatide (Insulin Glargine-Lixisenatide) 100 units-33 mcg/mL injection pen    Lancets 33G MISC    metFORMIN (GLUCOPHAGE) 1000 MG tablet    Misc Natural Products (BLOOD SUGAR 360 PO)    OneTouch Verio test strip    rosuvastatin (CRESTOR) 5 mg tablet    No Known Allergies        Objective     Blood pressure 128/64, pulse 78, height 5' 1\" (1.549 m), weight 85.4 kg (188 lb 3.2 oz), SpO2 99%, not currently breastfeeding. Body mass index is 35.56 kg/m².        PHYSICAL EXAM:      General Appearance:   Alert, cooperative, no distress   HEENT:   Normocephalic, atraumatic, anicteric.     Neck:  Supple, symmetrical, trachea midline   Lungs:   Clear to auscultation bilaterally; no rales, rhonchi or wheezing; respirations unlabored    Heart::   Regular rate and rhythm; no murmur, rub, or gallop.   Abdomen:   Soft, non-tender, non-distended; normal bowel sounds; no masses, no organomegaly. Benign abdomen.    Genitalia:   Deferred    Rectal:   Deferred    Extremities:  No cyanosis, clubbing or edema    Pulses:  2+ and symmetric    Skin:  No jaundice, rashes, or lesions    Lymph nodes:  No palpable cervical lymphadenopathy        Lab Results:   No visits with results within 1 Day(s) from this visit.   Latest known visit with results is:   Appointment on " 08/03/2024   Component Date Value    Vit D, 25-Hydroxy 08/03/2024 28.3 (L)     Cholesterol 08/03/2024 108     Triglycerides 08/03/2024 96     HDL, Direct 08/03/2024 46 (L)     LDL Calculated 08/03/2024 43     Hemoglobin A1C 08/03/2024 6.8 (H)     EAG 08/03/2024 148     Sodium 08/03/2024 138     Potassium 08/03/2024 4.1     Chloride 08/03/2024 102     CO2 08/03/2024 26     ANION GAP 08/03/2024 10     BUN 08/03/2024 10     Creatinine 08/03/2024 0.52 (L)     Glucose, Fasting 08/03/2024 119 (H)     Calcium 08/03/2024 9.1     AST 08/03/2024 21     ALT 08/03/2024 26     Alkaline Phosphatase 08/03/2024 54     Total Protein 08/03/2024 7.0     Albumin 08/03/2024 4.2     Total Bilirubin 08/03/2024 0.36     eGFR 08/03/2024 112     WBC 08/03/2024 10.00     RBC 08/03/2024 4.26     Hemoglobin 08/03/2024 12.2     Hematocrit 08/03/2024 38.8     MCV 08/03/2024 91     MCH 08/03/2024 28.6     MCHC 08/03/2024 31.4     RDW 08/03/2024 13.7     Platelets 08/03/2024 363     MPV 08/03/2024 10.7     Creatinine, Ur 08/03/2024 35.1     Albumin,U,Random 08/03/2024 <7.0     Albumin Creat Ratio 08/03/2024      Color, UA 08/03/2024 Colorless     Clarity, UA 08/03/2024 Clear     Specific Gravity, UA 08/03/2024 1.007     pH, UA 08/03/2024 6.0     Leukocytes, UA 08/03/2024 Negative     Nitrite, UA 08/03/2024 Negative     Protein, UA 08/03/2024 Negative     Glucose, UA 08/03/2024 Negative     Ketones, UA 08/03/2024 Negative     Urobilinogen, UA 08/03/2024 <2.0     Bilirubin, UA 08/03/2024 Negative     Occult Blood, UA 08/03/2024 Negative     TSH 3RD [facility] 08/03/2024 1.791          Radiology Results:   No results found.

## 2024-09-23 DIAGNOSIS — R19.8 IRREGULAR BOWEL HABITS: ICD-10-CM

## 2024-09-23 DIAGNOSIS — K52.9 CHRONIC DIARRHEA: ICD-10-CM

## 2024-09-23 DIAGNOSIS — Z79.4 TYPE 2 DIABETES MELLITUS WITHOUT COMPLICATION, WITH LONG-TERM CURRENT USE OF INSULIN (HCC): ICD-10-CM

## 2024-09-23 DIAGNOSIS — E11.9 TYPE 2 DIABETES MELLITUS WITHOUT COMPLICATION, WITH LONG-TERM CURRENT USE OF INSULIN (HCC): ICD-10-CM

## 2024-09-24 RX ORDER — DICYCLOMINE HCL 20 MG
20 TABLET ORAL 2 TIMES DAILY PRN
Qty: 180 TABLET | Refills: 1 | Status: SHIPPED | OUTPATIENT
Start: 2024-09-24

## 2024-09-28 ENCOUNTER — APPOINTMENT (OUTPATIENT)
Dept: LAB | Facility: MEDICAL CENTER | Age: 49
End: 2024-09-28
Payer: COMMERCIAL

## 2024-09-28 DIAGNOSIS — K52.9 CHRONIC DIARRHEA: ICD-10-CM

## 2024-09-28 LAB — IGA SERPL-MCNC: 160 MG/DL (ref 66–433)

## 2024-09-28 PROCEDURE — 86364 TISS TRNSGLTMNASE EA IG CLAS: CPT

## 2024-09-28 PROCEDURE — 86258 DGP ANTIBODY EACH IG CLASS: CPT

## 2024-09-28 PROCEDURE — 36415 COLL VENOUS BLD VENIPUNCTURE: CPT

## 2024-09-28 PROCEDURE — 83993 ASSAY FOR CALPROTECTIN FECAL: CPT

## 2024-09-28 PROCEDURE — 82784 ASSAY IGA/IGD/IGG/IGM EACH: CPT

## 2024-09-29 LAB
GLIADIN PEPTIDE IGA SER-ACNC: <0.2 U/ML
GLIADIN PEPTIDE IGA SER-ACNC: NEGATIVE
GLIADIN PEPTIDE IGG SER-ACNC: <0.4 U/ML
GLIADIN PEPTIDE IGG SER-ACNC: NEGATIVE
TTG IGA SER-ACNC: <0.5 U/ML
TTG IGA SER-ACNC: NEGATIVE
TTG IGG SER-ACNC: <0.8 U/ML
TTG IGG SER-ACNC: NEGATIVE

## 2024-09-30 LAB — CALPROTECTIN STL-MCNC: 51 ÂΜG/G

## 2024-11-08 ENCOUNTER — RA CDI HCC (OUTPATIENT)
Dept: OTHER | Facility: HOSPITAL | Age: 49
End: 2024-11-08

## 2024-11-08 NOTE — PROGRESS NOTES
HCC coding opportunities          Chart Reviewed number of suggestions sent to Provider: 1  E11.319     Patients Insurance        Commercial Insurance: [employer]

## 2024-11-15 ENCOUNTER — OFFICE VISIT (OUTPATIENT)
Dept: GASTROENTEROLOGY | Facility: AMBULARY SURGERY CENTER | Age: 49
End: 2024-11-15
Payer: COMMERCIAL

## 2024-11-15 ENCOUNTER — OFFICE VISIT (OUTPATIENT)
Dept: FAMILY MEDICINE CLINIC | Facility: CLINIC | Age: 49
End: 2024-11-15
Payer: COMMERCIAL

## 2024-11-15 VITALS
OXYGEN SATURATION: 99 % | HEIGHT: 61 IN | HEART RATE: 93 BPM | DIASTOLIC BLOOD PRESSURE: 72 MMHG | TEMPERATURE: 97.6 F | SYSTOLIC BLOOD PRESSURE: 124 MMHG | BODY MASS INDEX: 35.3 KG/M2 | WEIGHT: 187 LBS

## 2024-11-15 VITALS
HEIGHT: 61 IN | WEIGHT: 186.8 LBS | DIASTOLIC BLOOD PRESSURE: 68 MMHG | OXYGEN SATURATION: 100 % | BODY MASS INDEX: 35.27 KG/M2 | HEART RATE: 85 BPM | SYSTOLIC BLOOD PRESSURE: 132 MMHG

## 2024-11-15 DIAGNOSIS — E11.9 TYPE 2 DIABETES MELLITUS WITHOUT COMPLICATION, WITH LONG-TERM CURRENT USE OF INSULIN (HCC): Primary | ICD-10-CM

## 2024-11-15 DIAGNOSIS — Z79.4 TYPE 2 DIABETES MELLITUS WITHOUT COMPLICATION, WITH LONG-TERM CURRENT USE OF INSULIN (HCC): Primary | ICD-10-CM

## 2024-11-15 DIAGNOSIS — E78.2 MIXED HYPERLIPIDEMIA: ICD-10-CM

## 2024-11-15 DIAGNOSIS — R19.5 LOOSE STOOLS: Primary | ICD-10-CM

## 2024-11-15 PROCEDURE — 99213 OFFICE O/P EST LOW 20 MIN: CPT | Performed by: FAMILY MEDICINE

## 2024-11-15 PROCEDURE — 99213 OFFICE O/P EST LOW 20 MIN: CPT | Performed by: PHYSICIAN ASSISTANT

## 2024-11-15 NOTE — PROGRESS NOTES
Name: Kiesha Hannon      : 1975      MRN: [id number]  Encounter Provider: Keily Patterson PA-C  Encounter Date: 11/15/2024   Encounter department: [facility]  :  Assessment & Plan  Loose stools  Patient with change in bowel habits over the past several months.  At last office visit she started a fiber supplement and made significant dietary changes with decreasing artificial sweeteners, diet Coke and eating smaller meals throughout the day.  It took a few weeks however she reports over the past 2 weeks her bowel movements have been completely normal, 1 time daily.    -Significant symptom improvement at this time.  Recommend continuing to monitor for now.  - Continue Metamucil 1 tablespoon daily.  - Continue to avoid artificial sweeteners, diet Coke, rare fast food.  -If symptoms recur, consider SIBO testing and repeat endoscopic evaluation.  At this time, symptoms improved and no alarm symptoms therefore we will hold off.        History of Present Illness     HPI  Kiesha Hannon is a 48 y.o. female who presents with type 2 diabetes who presents the office for follow-up of irregular bowel habits.    Patient presented 2 months ago due to several months of looser stools.  Lab work including TSH, hemoglobin, celiac testing and fecal calprotectin were normal.  Since last office visit she started taking Metamucil 1 tablespoon daily.  She is also made significant dietary changes where she is eating smaller meals throughout the day, decreased artificial sweeteners and decreased Diet Coke intake.  She is also increasing water intake.  She reports it took a little bit but over the past 2 weeks her bowel movements have been relatively completely normal.  She reports 1 formed bowel movement daily.  She uses Bentyl very rarely and is only needed to take this about 3 times.  Weight stable.    Last colonoscopy 2020 with removal of few polyps and repeat recommended in 5  "years.        Review of Systems   All other systems reviewed and are negative.         Objective   /68 (BP Location: Left arm, Patient Position: Sitting, Cuff Size: Standard)   Pulse 85   Ht 5' 1\" (1.549 m)   Wt 84.7 kg (186 lb 12.8 oz)   LMP 11/02/2024 (Approximate)   SpO2 100%   BMI 35.30 kg/m²      Physical Exam  Vitals reviewed.   Constitutional:       General: She is not in acute distress.     Appearance: Normal appearance. She is not ill-appearing.   HENT:      Head: Normocephalic and atraumatic.   Eyes:      Conjunctiva/sclera: Conjunctivae normal.   Cardiovascular:      Rate and Rhythm: Normal rate and regular rhythm.      Heart sounds: No murmur heard.  Pulmonary:      Effort: Pulmonary effort is normal. No respiratory distress.      Breath sounds: Normal breath sounds.   Abdominal:      General: Abdomen is flat. There is no distension.      Palpations: Abdomen is soft.      Tenderness: There is no abdominal tenderness. There is no guarding or rebound.   Musculoskeletal:         General: No swelling.      Cervical back: Normal range of motion.      Right lower leg: No edema.      Left lower leg: No edema.   Skin:     General: Skin is warm.      Coloration: Skin is not jaundiced.   Neurological:      General: No focal deficit present.      Mental Status: She is alert and oriented to person, place, and time. Mental status is at baseline.   Psychiatric:         Mood and Affect: Mood normal.         Behavior: Behavior normal.           "

## 2024-11-15 NOTE — PROGRESS NOTES
Name: Kiesha Hannon      : 1975      MRN: [id number]  Encounter Provider: Venecia Hernandez MD  Encounter Date: 11/15/2024   Encounter department: [facility]  :  Assessment & Plan  Type 2 diabetes mellitus without complication, with long-term current use of insulin (HCC)  Chronic, stable  Continue good efforts with lifestyle  Continue Insulin, metformin per endo  Repeat labs 6 months   Obtain eye exam - she will be having at end of month   Lab Results   Component Value Date    HGBA1C 6.8 (H) 2024       Orders:    Lipid Panel with Direct LDL reflex; Future    Hemoglobin A1C; Future    Comprehensive metabolic panel; Future    CBC; Future    Albumin / creatinine urine ratio; Future    Mixed hyperlipidemia  Chronic, stable  Recent labs reviewed  Repeat labs 6 months  Continue Crestor 5mg qd              History of Present Illness     HPI Patient presents for follow up. She notes she was having some lows around lunch so her endo reduced her insulin. She was in the 50's. Sometimes she had symptoms but not always. Symptoms are jittery.     She reports her diarrhea has been improving. She uses the Bentyl sparingly. She is eating smaller meals, drinking more water, cutting back soda.     Review of Systems   Constitutional:  Negative for chills and fever.   HENT:  Negative for congestion and sore throat.    Eyes:  Negative for pain and visual disturbance.   Respiratory:  Negative for cough and shortness of breath.    Cardiovascular:  Negative for chest pain and palpitations.   Gastrointestinal:  Negative for abdominal pain and nausea.   Genitourinary:  Negative for dysuria.   Musculoskeletal:  Negative for arthralgias and myalgias.   Skin:  Negative for rash and wound.   Neurological:  Negative for dizziness and headaches.   All other systems reviewed and are negative.    Medical History Reviewed by provider this encounter:     .     Objective   /72   Pulse 93   Temp  "97.6 °F (36.4 °C)   Ht 5' 1\" (1.549 m)   Wt 84.8 kg (187 lb)   LMP 11/02/2024 (Approximate)   SpO2 99%   BMI 35.33 kg/m²      Physical Exam  Vitals and nursing note reviewed.   Constitutional:       General: She is not in acute distress.     Appearance: She is well-developed.   HENT:      Head: Normocephalic and atraumatic.      Right Ear: External ear normal.      Left Ear: External ear normal.      Nose: Nose normal.   Eyes:      Conjunctiva/sclera: Conjunctivae normal.   Neck:      Trachea: No tracheal deviation.   Pulmonary:      Effort: Pulmonary effort is normal.   Abdominal:      Tenderness: There is no abdominal tenderness.   Skin:     General: Skin is warm and dry.      Capillary Refill: Capillary refill takes less than 2 seconds.      Findings: No rash.   Neurological:      Mental Status: She is alert.      Cranial Nerves: No cranial nerve deficit.         "

## 2024-11-15 NOTE — Clinical Note
Please send care gap next month to for eye exam. Will have done toward end of November at [facility]

## 2024-11-15 NOTE — ASSESSMENT & PLAN NOTE
Chronic, stable  Continue good efforts with lifestyle  Continue Insulin, metformin per endo  Repeat labs 6 months   Obtain eye exam - she will be having at end of month   Lab Results   Component Value Date    HGBA1C 6.8 (H) 08/03/2024       Orders:    Lipid Panel with Direct LDL reflex; Future    Hemoglobin A1C; Future    Comprehensive metabolic panel; Future    CBC; Future    Albumin / creatinine urine ratio; Future

## 2024-11-25 ENCOUNTER — TELEPHONE (OUTPATIENT)
Dept: ADMINISTRATIVE | Facility: OTHER | Age: 49
End: 2024-11-25

## 2024-11-25 LAB
LEFT EYE DIABETIC RETINOPATHY: NORMAL
RIGHT EYE DIABETIC RETINOPATHY: NORMAL

## 2024-11-25 NOTE — TELEPHONE ENCOUNTER
----- Message from Serina WASHINGTON sent at 11/25/2024 10:52 AM EST -----  Regarding: Care Gap Request  11/25/24 10:52 AM    Hello, our patient above has had Diabetic Eye Exam completed/performed. Please assist in updating the patient chart by making an External outreach to [facility] located in [place], [place]. The date of service is within this last week.    Thank you,  Serina Briseno MA  [facility]

## 2024-11-25 NOTE — TELEPHONE ENCOUNTER
Upon review of the In Basket request and the patient's chart, initial outreach has been made via fax to facility. Please see Contacts section for details.     Thank you  Mari Lazcano MA

## 2024-11-25 NOTE — LETTER
Diabetic Eye Exam Form 2nd Request!    Date Requested: 24  Patient: Kiesha Hannon  Patient : 1975   Referring Provider: Venecia Hernandez MD      DIABETIC Eye Exam Date _______________________________      Type of Exam MUST be documented for Diabetic Eye Exams. Please CHECK ONE.     Retinal Exam       Dilated Retinal Exam       OCT       Optomap-Iris Exam      Fundus Photography       Left Eye - Please check Retinopathy or No Retinopathy        Exam did show retinopathy    Exam did not show retinopathy       Right Eye - Please check Retinopathy or No Retinopathy       Exam did show retinopathy    Exam did not show retinopathy       Comments __________________________________________________________    Practice Providing Exam ______________________________________________    Exam Performed By (print name) _______________________________________      Provider Signature ___________________________________________________      These reports are needed for  compliance.  Please fax this completed form and a copy of the Diabetic Eye Exam report to our office located at [address] [place] [address] as soon as possible via Fax [phone] eusebia Guerra: Phone [phone]  We thank you for your assistance in treating our mutual patient.

## 2024-11-25 NOTE — LETTER
Diabetic Eye Exam Form    Date Requested: 24  Patient: Kiesha Hannon  Patient : 1975   Referring Provider: Venecia Hernandez MD      DIABETIC Eye Exam Date _______________________________      Type of Exam MUST be documented for Diabetic Eye Exams. Please CHECK ONE.     Retinal Exam       Dilated Retinal Exam       OCT       Optomap-Iris Exam      Fundus Photography       Left Eye - Please check Retinopathy or No Retinopathy        Exam did show retinopathy    Exam did not show retinopathy       Right Eye - Please check Retinopathy or No Retinopathy       Exam did show retinopathy    Exam did not show retinopathy       Comments __________________________________________________________    Practice Providing Exam ______________________________________________    Exam Performed By (print name) _______________________________________      Provider Signature ___________________________________________________      These reports are needed for  compliance.  Please fax this completed form and a copy of the Diabetic Eye Exam report to our office located at [address] [place] [address] as soon as possible via Fax [phone] attention Mari: Phone [phone]  We thank you for your assistance in treating our mutual patient.

## 2024-12-04 NOTE — TELEPHONE ENCOUNTER
As a follow-up, a second attempt has been made for outreach via fax to facility. Please see Contacts section for details.    Thank you  Mari Lazcano MA

## 2024-12-06 NOTE — TELEPHONE ENCOUNTER
Upon review of the In Basket request we were able to locate, review, and update the patient chart as requested for Diabetic Eye Exam.    Any additional questions or concerns should be emailed to the Practice Liaisons via the appropriate education email address, please do not reply via In Basket.    Thank you  Mari Lazcano MA   [facility]

## 2024-12-31 ENCOUNTER — OFFICE VISIT (OUTPATIENT)
Dept: FAMILY MEDICINE CLINIC | Facility: CLINIC | Age: 49
End: 2024-12-31
Payer: COMMERCIAL

## 2024-12-31 VITALS
HEIGHT: 61 IN | WEIGHT: 186 LBS | DIASTOLIC BLOOD PRESSURE: 74 MMHG | OXYGEN SATURATION: 98 % | HEART RATE: 99 BPM | BODY MASS INDEX: 35.12 KG/M2 | SYSTOLIC BLOOD PRESSURE: 128 MMHG | TEMPERATURE: 98.5 F

## 2024-12-31 DIAGNOSIS — J01.40 ACUTE NON-RECURRENT PANSINUSITIS: Primary | ICD-10-CM

## 2024-12-31 PROCEDURE — 99214 OFFICE O/P EST MOD 30 MIN: CPT | Performed by: NURSE PRACTITIONER

## 2024-12-31 NOTE — PROGRESS NOTES
"Name: Kiesha Hannon      : 1975      MRN: [id number]  Encounter Provider: TERESO Maria  Encounter Date: 2024   Encounter department: [facility]  :  Assessment & Plan  Acute non-recurrent pansinusitis    Orders:  •  amoxicillin-clavulanate (AUGMENTIN) 875-125 mg per tablet; Take 1 tablet by mouth every 12 (twelve) hours for 7 days    Patient instructed to call if no improvement in 72 hours or symptoms worsen         History of Present Illness     49 y.o.female presenting with ear pain (left> right), sore throat, and nasal congestion for the past 5 days. She has been using over the counter Severe Sinus medication without much improvement. She denies fever, chills, generalized body aches, cough, shortness of breath or headache.        Review of Systems   Constitutional: Negative.    HENT:  Positive for congestion, ear pain and sore throat.    Respiratory: Negative.     Cardiovascular: Negative.    Gastrointestinal: Negative.    Musculoskeletal: Negative.    Neurological: Negative.    Psychiatric/Behavioral: Negative.         Objective   /74 (BP Location: Right arm, Patient Position: Sitting, Cuff Size: Large)   Pulse 99   Temp 98.5 °F (36.9 °C) (Temporal)   Ht 5' 1\" (1.549 m)   Wt 84.4 kg (186 lb)   LMP 2024 (Exact Date)   SpO2 98%   BMI 35.14 kg/m² (Reviewed)     Physical Exam  Vitals reviewed.   Constitutional:       General: She is not in acute distress.     Appearance: She is not ill-appearing.   HENT:      Head: Normocephalic and atraumatic.      Right Ear: Ear canal and external ear normal. Tympanic membrane is bulging.      Left Ear: Ear canal and external ear normal. Tympanic membrane is bulging.      Nose: Congestion present.      Mouth/Throat:      Mouth: Mucous membranes are moist.      Pharynx: Oropharynx is clear. Posterior oropharyngeal erythema present.   Eyes:      Extraocular Movements: Extraocular movements intact.      " Conjunctiva/sclera: Conjunctivae normal.      Pupils: Pupils are equal, round, and reactive to light.   Cardiovascular:      Rate and Rhythm: Normal rate and regular rhythm.      Heart sounds: Normal heart sounds.   Pulmonary:      Effort: Pulmonary effort is normal.      Breath sounds: Normal breath sounds.   Skin:     General: Skin is warm and dry.      Capillary Refill: Capillary refill takes less than 2 seconds.   Neurological:      Mental Status: She is alert and oriented to person, place, and time.   Psychiatric:         Mood and Affect: Mood normal.         Behavior: Behavior normal.

## 2025-01-16 ENCOUNTER — ANNUAL EXAM (OUTPATIENT)
Dept: OBGYN CLINIC | Facility: MEDICAL CENTER | Age: 50
End: 2025-01-16
Payer: COMMERCIAL

## 2025-01-16 VITALS
HEIGHT: 61 IN | WEIGHT: 184 LBS | SYSTOLIC BLOOD PRESSURE: 124 MMHG | BODY MASS INDEX: 34.74 KG/M2 | DIASTOLIC BLOOD PRESSURE: 76 MMHG

## 2025-01-16 DIAGNOSIS — Z12.31 ENCOUNTER FOR SCREENING MAMMOGRAM FOR MALIGNANT NEOPLASM OF BREAST: ICD-10-CM

## 2025-01-16 DIAGNOSIS — Z01.419 ENCOUNTER FOR WELL WOMAN EXAM WITH ROUTINE GYNECOLOGICAL EXAM: Primary | ICD-10-CM

## 2025-01-16 DIAGNOSIS — R92.30 DENSE BREAST TISSUE ON MAMMOGRAM, UNSPECIFIED TYPE: ICD-10-CM

## 2025-01-16 PROCEDURE — 99396 PREV VISIT EST AGE 40-64: CPT | Performed by: PHYSICIAN ASSISTANT

## 2025-01-16 NOTE — PROGRESS NOTES
Assessment   49 y.o.  presenting for annual exam.     Plan   Diagnoses and all orders for this visit:    Encounter for well woman exam with routine gynecological exam    Encounter for screening mammogram for malignant neoplasm of breast    Dense breast tissue on mammogram, unspecified type     Discussed with patient that skipped menses is normal for her age, and that it may continue throughout the year. Encouraged to call the office if she begins to bleed heavily to the point of changing a pad or tampon every hour. Also discussed other symptoms that may start in the next year, including hot flashes and vaginal dryness.  Pap up to date  Mammo slip given, scheduled for 2025. ABUS scheduled for 2025 due to dense breast tissue.   Colonoscopy due this year, 2025    Perineal hygiene reviewed. Weight bearing exercises minium of 150 mins/weekly advised. Kegel exercises recommended.   SBE encouraged, A yearly mammogram is recommended for breast cancer screening starting at age 40. ASCCP guidelines reviewed. Calcium/ Vit D dietary requirements discussed.   Advised to call with any issues, all concerns & questions addressed.   See provided information in your after visit summary     F/U Annually and PRN    Results will be released to [facility], if abnormal will call or message to review and discuss treatment plan.     __________________________________________________________________    Subjective     Kiesha Hannon is a 49 y.o.  presenting for annual exam. She is without complaint and does not want STD testing today.     Says that her menstrual cycles are usually regular, lasting about 5 days and occurring every 28-30 days. Her LMP was 2024 and she has not gotten a period yet this month which is irregular for her. Usually can tell that she is about to get her period, had that at the beginning of this year but then her period never came. Denies any perimenopausal symptoms such as hot flashes  or vaginal dryness. Says that she had one instance last year that she skipped one month of her cycle, but then it came back and was regular after that. Denies any change in volume of blood or worsening of cyclic symptoms after her period returned last year.     SCREENING  Last Pap: 2023 neg/nathaniel  Last Mammo: 2024 BiRADS 2; benign; lifetime risk 10.28%  Last Colonoscopy: 2020 5 year recall      GYN  LMP: 2024  Periods are regular every 28-30 days, lasting 5 days.   Dysmenorrhea:none.   Cyclic symptoms include none    Sexually active: Yes - single partner - male  Contraception: Tubal    Hx Abnormal pap: denies  We reviewed ASCCP guidelines for Pap testing today.    Denies vaginal discharge, itching, odor, dyspareunia, pelvic pain and vulvar/vaginal symptoms      OB           Complaints: denies   Denies urgency, frequency, hematuria, leakage / change in stream, difficulty urinating.       BREAST  Complaints: denies   Denies: breast lump, breast tenderness, nipple discharge, skin color change, and skin lesion(s)      Pertinent Family Hx:   Family hx of breast cancer: no  Family hx of ovarian cancer: no  Family hx of colon cancer: PGF      GENERAL  PMH reviewed/updated and is as below.   Patient does follow with a PCP.    SOCIAL  Smoking: No  Alcohol:Socially  Drug: No  Occupation:       Past Medical History:   Diagnosis Date    Breast lump     last assessed: 13    COVID-19 2022    Diabetes mellitus (HCC)     Diabetes mellitus type II, controlled (HCC)     Eustachian tube dysfunction     last assessed: 12/14/15    Hx of tubal ligation     Miscarriage     Vagina, candidiasis     last assessed: 13       Past Surgical History:   Procedure Laterality Date    BREAST BIOPSY Right     MRI Breast BX 2011     SECTION      x3    TUBAL LIGATION  2012    WISDOM TOOTH EXTRACTION           Current Outpatient Medications:     BD Pen Needle Shellie U/F 32G X 4 MM MISC,  , Disp: , Rfl:     BD ULTRA-FINE LANCETS lancets, USE TO INJECT INSULIN ONCE DAILY AS DIRECTED (FURTHER REFILLS TO BE GIVEN AT UPCOMING APPOINTMENT), Disp: , Rfl:     Blood Glucose Monitoring Suppl (OneTouch Verio Flex System) w/Device KIT, USE AS DIRECTED. ONE TOUCH VERIO, Disp: , Rfl:     Continuous Blood Gluc Sensor (FreeStyle Jaxson 2 Sensor) MISC, , Disp: , Rfl:     dicyclomine (BENTYL) 20 mg tablet, TAKE 1 TABLET (20 MG TOTAL) BY MOUTH 2 (TWO) TIMES A DAY AS NEEDED (BOWEL URGENCY, LOOSE STOOLS), Disp: 180 tablet, Rfl: 1    Insulin Glargine-Lixisenatide (Insulin Glargine-Lixisenatide) 100 units-33 mcg/mL injection pen, Inject 45 Units under the skin daily, Disp: , Rfl:     Lancets 33G MISC, Use to test glucoses BID. One Touch Delica., Disp: , Rfl:     metFORMIN (GLUCOPHAGE) 1000 MG tablet, TAKE 1 TABLET TWICE A DAY WITH MEALS, Disp: 180 tablet, Rfl: 1    Misc Natural Products (BLOOD SUGAR 360 PO), One touch verio strips Testing BID, Disp: , Rfl:     OneTouch Verio test strip, , Disp: , Rfl:     rosuvastatin (CRESTOR) 5 mg tablet, TAKE 1 TABLET BY MOUTH EVERY DAY, Disp: 90 tablet, Rfl: 3    No Known Allergies    Social History     Socioeconomic History    Marital status: /Civil Union     Spouse name: Not on file    Number of children: 3    Years of education: Not on file    Highest education level: Not on file   Occupational History    Not on file   Tobacco Use    Smoking status: Never     Passive exposure: Never    Smokeless tobacco: Never   Vaping Use    Vaping status: Never Used   Substance and Sexual Activity    Alcohol use: Yes     Comment: Only occasionally or socially    Drug use: Never    Sexual activity: Yes     Partners: Male     Birth control/protection: Female Sterilization   Other Topics Concern    Not on file   Social History Narrative    Coffee    Exercise frequently     Social Drivers of Health     Financial Resource Strain: Low Risk  (4/26/2021)    Overall Financial Resource Strain  "(CARDIA)     Difficulty of Paying Living Expenses: Not hard at all   Food Insecurity: No Food Insecurity (4/26/2021)    Hunger Vital Sign     Worried About Running Out of Food in the Last Year: Never true     Ran Out of Food in the Last Year: Never true   Transportation Needs: No Transportation Needs (4/26/2021)    PRAPARE - Transportation     Lack of Transportation (Medical): No     Lack of Transportation (Non-Medical): No   Physical Activity: Inactive (4/26/2021)    Exercise Vital Sign     Days of Exercise per Week: 0 days     Minutes of Exercise per Session: 0 min   Stress: No Stress Concern Present (4/26/2021)    [employer] [place] [employer] - Occupational Stress Questionnaire     Feeling of Stress : Only a little   Social Connections: Unknown (4/26/2021)    Social Connection and Isolation Panel [NHANES]     Frequency of Communication with Friends and Family: Not on file     Frequency of Social Gatherings with Friends and Family: Not on file     Attends Muslim Services: Not on file     Active Member of Clubs or Organizations: Not on file     Attends Club or Organization Meetings: Not on file     Marital Status:    Intimate Partner Violence: Not on file   Housing Stability: Not on file       Review of Systems     ROS:  Constitutional: Negative for fatigue and unexpected weight change.   Respiratory: Negative for cough and shortness of breath.    Cardiovascular: Negative for chest pain and palpitations.   Gastrointestinal: Negative for abdominal pain and change in bowel habits  Breasts:  Negative, other than as noted above.   Genitourinary: Negative, other than as noted above.   Psychiatric: Negative for mood difficulties.      Objective      /76 (BP Location: Left arm, Patient Position: Sitting, Cuff Size: Large)   Ht 5' 0.5\" (1.537 m)   Wt 83.5 kg (184 lb)   LMP 12/02/2024 (Exact Date)   BMI 35.34 kg/m²     Physical Examination:    Patient appears well and is not in distress  Neck " is supple without masses, no cervical or supraclavicular lymphadenopathy  Cardiovascular: regular rate and rhythm; no murmurs  Lungs: clear to auscultation bilaterally; no wheezes  Breasts are symmetrical without mass, tenderness, nipple discharge, skin changes or adenopathy. Dense breast tissue on palpation  Abdomen is soft and nontender without masses.   External genitals are normal without lesions or rashes.  Urethral meatus and urethra are normal  Bladder is normal to palpation  Vagina is normal without discharge or bleeding.   Cervix is normal without discharge or lesion.   Uterus is normal, mobile, nontender without palpable mass.  Adnexa are normal, nontender, without palpable mass.     History and physical exam performed by Sharri JHA with direct supervision by me

## 2025-02-22 ENCOUNTER — APPOINTMENT (OUTPATIENT)
Dept: LAB | Facility: MEDICAL CENTER | Age: 50
End: 2025-02-22
Payer: COMMERCIAL

## 2025-02-22 DIAGNOSIS — Z79.4 TYPE 2 DIABETES MELLITUS WITHOUT COMPLICATION, WITH LONG-TERM CURRENT USE OF INSULIN (HCC): ICD-10-CM

## 2025-02-22 DIAGNOSIS — E11.9 TYPE 2 DIABETES MELLITUS WITHOUT COMPLICATION, WITH LONG-TERM CURRENT USE OF INSULIN (HCC): ICD-10-CM

## 2025-02-22 LAB
ALBUMIN SERPL BCG-MCNC: 4.4 G/DL (ref 3.5–5)
ALP SERPL-CCNC: 52 U/L (ref 34–104)
ALT SERPL W P-5'-P-CCNC: 17 U/L (ref 7–52)
ANION GAP SERPL CALCULATED.3IONS-SCNC: 7 MMOL/L (ref 4–13)
AST SERPL W P-5'-P-CCNC: 17 U/L (ref 13–39)
BILIRUB SERPL-MCNC: 0.45 MG/DL (ref 0.2–1)
BUN SERPL-MCNC: 14 MG/DL (ref 5–25)
CALCIUM SERPL-MCNC: 9.5 MG/DL (ref 8.4–10.2)
CHLORIDE SERPL-SCNC: 105 MMOL/L (ref 96–108)
CHOLEST SERPL-MCNC: 104 MG/DL (ref ?–200)
CO2 SERPL-SCNC: 23 MMOL/L (ref 21–32)
CREAT SERPL-MCNC: 0.57 MG/DL (ref 0.6–1.3)
CREAT UR-MCNC: 62.6 MG/DL
ERYTHROCYTE [DISTWIDTH] IN BLOOD BY AUTOMATED COUNT: 13.7 % (ref 11.6–15.1)
EST. AVERAGE GLUCOSE BLD GHB EST-MCNC: 143 MG/DL
GFR SERPL CREATININE-BSD FRML MDRD: 108 ML/MIN/1.73SQ M
GLUCOSE P FAST SERPL-MCNC: 135 MG/DL (ref 65–99)
HBA1C MFR BLD: 6.6 %
HCT VFR BLD AUTO: 40 % (ref 34.8–46.1)
HDLC SERPL-MCNC: 45 MG/DL
HGB BLD-MCNC: 12.6 G/DL (ref 11.5–15.4)
LDLC SERPL CALC-MCNC: 45 MG/DL (ref 0–100)
MCH RBC QN AUTO: 29.1 PG (ref 26.8–34.3)
MCHC RBC AUTO-ENTMCNC: 31.5 G/DL (ref 31.4–37.4)
MCV RBC AUTO: 92 FL (ref 82–98)
MICROALBUMIN UR-MCNC: <7 MG/L
PLATELET # BLD AUTO: 332 THOUSANDS/UL (ref 149–390)
PMV BLD AUTO: 11 FL (ref 8.9–12.7)
POTASSIUM SERPL-SCNC: 4.5 MMOL/L (ref 3.5–5.3)
PROT SERPL-MCNC: 7.2 G/DL (ref 6.4–8.4)
RBC # BLD AUTO: 4.33 MILLION/UL (ref 3.81–5.12)
SODIUM SERPL-SCNC: 135 MMOL/L (ref 135–147)
TRIGL SERPL-MCNC: 71 MG/DL (ref ?–150)
WBC # BLD AUTO: 9 THOUSAND/UL (ref 4.31–10.16)

## 2025-02-22 PROCEDURE — 82570 ASSAY OF URINE CREATININE: CPT

## 2025-02-22 PROCEDURE — 83036 HEMOGLOBIN GLYCOSYLATED A1C: CPT

## 2025-02-22 PROCEDURE — 36415 COLL VENOUS BLD VENIPUNCTURE: CPT

## 2025-02-22 PROCEDURE — 82043 UR ALBUMIN QUANTITATIVE: CPT

## 2025-02-22 PROCEDURE — 85027 COMPLETE CBC AUTOMATED: CPT

## 2025-02-22 PROCEDURE — 80061 LIPID PANEL: CPT

## 2025-02-22 PROCEDURE — 80053 COMPREHEN METABOLIC PANEL: CPT

## 2025-02-24 ENCOUNTER — RESULTS FOLLOW-UP (OUTPATIENT)
Dept: FAMILY MEDICINE CLINIC | Facility: CLINIC | Age: 50
End: 2025-02-24

## 2025-03-21 DIAGNOSIS — E11.9 TYPE 2 DIABETES MELLITUS WITHOUT COMPLICATION, WITH LONG-TERM CURRENT USE OF INSULIN (HCC): ICD-10-CM

## 2025-03-21 DIAGNOSIS — Z79.4 TYPE 2 DIABETES MELLITUS WITHOUT COMPLICATION, WITH LONG-TERM CURRENT USE OF INSULIN (HCC): ICD-10-CM

## 2025-04-17 ENCOUNTER — TELEPHONE (OUTPATIENT)
Age: 50
End: 2025-04-17

## 2025-04-17 DIAGNOSIS — R07.2 PRECORDIAL PAIN: Primary | ICD-10-CM

## 2025-04-17 NOTE — TELEPHONE ENCOUNTER
Patient called and stated she was seen in the ER @ [facility] on 4/16 for chest pain.Patient wanted to know does she need a follow up appt and can the test that were ordered be revived.Does patient need any additional testing.Please advise.

## 2025-04-18 NOTE — TELEPHONE ENCOUNTER
I reviewed tests, all was reassuring. Given she had this pain, I do recommend a stress test which I ordered. She needs to be able to walk uphill on a treadmill for at least 6 minutes. If she does not feel she could do this, I can order the pharmacologic stress test, but the treadmill test is preferred.   Stress test ordered.   No appt needed, I see her next month.

## 2025-04-24 ENCOUNTER — RESULTS FOLLOW-UP (OUTPATIENT)
Dept: FAMILY MEDICINE CLINIC | Facility: CLINIC | Age: 50
End: 2025-04-24

## 2025-04-24 ENCOUNTER — HOSPITAL ENCOUNTER (OUTPATIENT)
Dept: NON INVASIVE DIAGNOSTICS | Facility: CLINIC | Age: 50
Discharge: HOME | End: 2025-04-24
Attending: FAMILY MEDICINE
Payer: COMMERCIAL

## 2025-04-24 DIAGNOSIS — R07.2 PRECORDIAL PAIN: ICD-10-CM

## 2025-04-24 LAB
CHEST PAIN STATEMENT: NORMAL
MAX DIASTOLIC BP: 60 MMHG
MAX HR PERCENT: 103 %
MAX HR: 176 BPM
MAX PREDICTED HEART RATE: 170 BPM
PROTOCOL NAME: NORMAL
RATE PRESSURE PRODUCT: NORMAL
SL CV STRESS RECOVERY BP: NORMAL MMHG
SL CV STRESS RECOVERY HR: 115 BPM
SL CV STRESS RECOVERY O2 SAT: 98 %
SL CV STRESS STAGE REACHED: 3
STRESS ANGINA INDEX: 0
STRESS BASELINE BP: NORMAL MMHG
STRESS BASELINE HR: 93 BPM
STRESS DUKE TREADMILL SCORE: -2
STRESS O2 SAT REST: 99 %
STRESS PEAK HR: 176 BPM
STRESS POST ESTIMATED WORKLOAD: 10.1 METS
STRESS POST EXERCISE DUR MIN: 8 MIN
STRESS POST EXERCISE DUR MIN: 8 MIN
STRESS POST EXERCISE DUR SEC: 0 SEC
STRESS POST EXERCISE DUR SEC: 0 SEC
STRESS POST O2 SAT PEAK: 98 %
STRESS POST PEAK BP: 192 MMHG
STRESS POST PEAK HR: 176 BPM
STRESS POST PEAK SYSTOLIC BP: 192 MMHG
STRESS ST DEPRESSION: 2 MM
TARGET HR FORMULA: NORMAL
TEST INDICATION: NORMAL

## 2025-04-24 PROCEDURE — 93018 CV STRESS TEST I&R ONLY: CPT | Performed by: INTERNAL MEDICINE

## 2025-04-24 PROCEDURE — 93017 CV STRESS TEST TRACING ONLY: CPT

## 2025-04-24 PROCEDURE — 93016 CV STRESS TEST SUPVJ ONLY: CPT | Performed by: INTERNAL MEDICINE

## 2025-05-04 DIAGNOSIS — Z79.4 TYPE 2 DIABETES MELLITUS WITHOUT COMPLICATION, WITH LONG-TERM CURRENT USE OF INSULIN (HCC): ICD-10-CM

## 2025-05-04 DIAGNOSIS — E11.9 TYPE 2 DIABETES MELLITUS WITHOUT COMPLICATION, WITH LONG-TERM CURRENT USE OF INSULIN (HCC): ICD-10-CM

## 2025-05-04 RX ORDER — ROSUVASTATIN CALCIUM 5 MG/1
5 TABLET, COATED ORAL DAILY
Qty: 90 TABLET | Refills: 3 | Status: SHIPPED | OUTPATIENT
Start: 2025-05-04

## 2025-05-13 ENCOUNTER — RA CDI HCC (OUTPATIENT)
Dept: OTHER | Facility: HOSPITAL | Age: 50
End: 2025-05-13

## 2025-05-13 NOTE — PROGRESS NOTES
HCC coding opportunities          Chart Reviewed number of suggestions sent to Provider: 1     Patients Insurance        Commercial Insurance: qualifyor Commercial Insurance     E66.01

## 2025-05-14 ENCOUNTER — OFFICE VISIT (OUTPATIENT)
Dept: GASTROENTEROLOGY | Facility: AMBULARY SURGERY CENTER | Age: 50
End: 2025-05-14
Payer: COMMERCIAL

## 2025-05-14 VITALS
HEIGHT: 61 IN | HEART RATE: 90 BPM | BODY MASS INDEX: 34.02 KG/M2 | WEIGHT: 180.2 LBS | OXYGEN SATURATION: 100 % | DIASTOLIC BLOOD PRESSURE: 74 MMHG | SYSTOLIC BLOOD PRESSURE: 124 MMHG

## 2025-05-14 DIAGNOSIS — R07.89 ATYPICAL CHEST PAIN: ICD-10-CM

## 2025-05-14 DIAGNOSIS — Z86.0100 HISTORY OF COLON POLYPS: ICD-10-CM

## 2025-05-14 DIAGNOSIS — R19.5 LOOSE STOOLS: Primary | ICD-10-CM

## 2025-05-14 PROCEDURE — 99213 OFFICE O/P EST LOW 20 MIN: CPT | Performed by: PHYSICIAN ASSISTANT

## 2025-05-14 RX ORDER — SODIUM, POTASSIUM,MAG SULFATES 17.5-3.13G
1 SOLUTION, RECONSTITUTED, ORAL ORAL ONCE
Qty: 354 ML | Refills: 0 | Status: SHIPPED | OUTPATIENT
Start: 2025-05-14 | End: 2025-05-14

## 2025-05-14 RX ORDER — SODIUM CHLORIDE, SODIUM LACTATE, POTASSIUM CHLORIDE, CALCIUM CHLORIDE 600; 310; 30; 20 MG/100ML; MG/100ML; MG/100ML; MG/100ML
125 INJECTION, SOLUTION INTRAVENOUS CONTINUOUS
OUTPATIENT
Start: 2025-05-14

## 2025-05-14 NOTE — PATIENT INSTRUCTIONS
Scheduled date of colonoscopy (as of today): Nov 25, 2025  Physician performing colonoscopy: Dr Rodriguez   Location of colonoscopy: [facility]   Bowel prep reviewed with patient: Suprep   Instructions reviewed with patient by: BILL   Clearances: n/a

## 2025-05-14 NOTE — PROGRESS NOTES
Name: Kiesha Hannon      : 1975      MRN: [id number]  Encounter Provider: Keily Patterson PA-C  Encounter Date: 2025   Encounter department: [facility]  :  Assessment & Plan  History of colon polyps  Last colonoscopy 2020 with removal of polyps, repeat recommended in 5 years.  She is doing well at this time.  Most recent hemoglobin normal.    - Patient will be due for colonoscopy 2025.  Will schedule today.  -Discussed the risks of the procedure including bleeding, infection and perforation.  - Suprep ordered    Loose stools  Symptoms resolved.  She is taking Metamucil daily with regular bowel movements.  No blood.    - Continue daily Metamucil.  - Continue to avoid any known food triggers.       Atypical chest pain  ER visit a month ago for symptoms.  Cardiac workup unremarkable.  Stress test normal.  She used Tums which helped the symptoms.  She has not needed any Tums in 2 weeks.    - Advised patient to keep me updated with the symptoms.  If she needs to start using Tums again and is using this several times throughout the week I recommend she reach out to me.  At that time would likely recommend PPI course and EGD. Will hold off on any further workup for now since she is asymptomatic.            History of Present Illness   Kiesha Hannon is a 49 y.o. female who presents with type 2 diabetes presents the office for follow-up of loose stools.    Patient continues to do well.  She has no significant GI symptoms.  She continues with Metamucil daily.  Her bowel movements are regular.  No blood in the stool.  She was recently at the emergency room a month ago for atypical chest pain.  Cardiac workup was unremarkable.  Stress test was normal.  She reports Tums helped this.  She has not needed this in the past 2 weeks.    Most recent lab work showing normal hemoglobin and liver enzymes.    Last colonoscopy 2020 with removal of polyps and repeat  "recommended in 5 years    HPI    Review of Systems   All other systems reviewed and are negative.   A complete review of systems is negative other than that noted above in the HPI.      Current Medications[1]  Objective   /74 (BP Location: Left arm, Patient Position: Sitting, Cuff Size: Standard)   Pulse 90   Ht 5' 1\" (1.549 m)   Wt 81.7 kg (180 lb 3.2 oz)   SpO2 100%   BMI 34.05 kg/m²     Physical Exam  Vitals reviewed.   Constitutional:       General: She is not in acute distress.     Appearance: Normal appearance. She is not ill-appearing.   HENT:      Head: Normocephalic and atraumatic.     Eyes:      Conjunctiva/sclera: Conjunctivae normal.       Cardiovascular:      Rate and Rhythm: Normal rate and regular rhythm.      Heart sounds: No murmur heard.  Pulmonary:      Effort: Pulmonary effort is normal. No respiratory distress.      Breath sounds: Normal breath sounds.   Abdominal:      General: Abdomen is flat. There is no distension.      Palpations: Abdomen is soft.      Tenderness: There is no abdominal tenderness. There is no guarding or rebound.     Musculoskeletal:         General: No swelling.      Cervical back: Normal range of motion.      Right lower leg: No edema.      Left lower leg: No edema.     Skin:     General: Skin is warm.      Coloration: Skin is not jaundiced.     Neurological:      General: No focal deficit present.      Mental Status: She is alert and oriented to person, place, and time. Mental status is at baseline.     Psychiatric:         Mood and Affect: Mood normal.         Behavior: Behavior normal.          Lab Results: I personally reviewed relevant lab results.       Results for orders placed during the hospital encounter of 11/19/20    Colonoscopy    Impression  Two sessile polyps measuring 5-9 mm in the ascending colon and transverse colon; removed en bloc by cold snare and retrieved specimen  Visualized mucosa appeared otherwise normal.    RECOMMENDATION:  Repeat in " 5 years due to a personal history of colon polyps seen on today's examination  Follow up pathology  Resume regular diet  Discharge home    If you have abdominal pain, bleeding, or fevers you are encouraged to contact our office at [phone]. If you do not hear from our office or your symptoms become severe, please go to your nearest ER for evaluation.        Geri Rodriguez MD               [1]   Current Outpatient Medications   Medication Sig Dispense Refill    BD Pen Needle Shellie U/F 32G X 4 MM MISC       BD ULTRA-FINE LANCETS lancets       Blood Glucose Monitoring Suppl (OneTouch Verio Flex System) w/Device KIT       Continuous Blood Gluc Sensor (FreeStyle Jaxson 2 Sensor) MISC       dicyclomine (BENTYL) 20 mg tablet TAKE 1 TABLET (20 MG TOTAL) BY MOUTH 2 (TWO) TIMES A DAY AS NEEDED (BOWEL URGENCY, LOOSE STOOLS) 180 tablet 1    Insulin Glargine-Lixisenatide (Insulin Glargine-Lixisenatide) 100 units-33 mcg/mL injection pen Inject 45 Units under the skin in the morning.      Lancets 33G MISC       metFORMIN (GLUCOPHAGE) 1000 MG tablet TAKE 1 TABLET TWICE A DAY WITH MEALS 180 tablet 1    Misc Natural Products (BLOOD SUGAR 360 PO)       Na Sulfate-K Sulfate-Mg Sulf (Suprep Bowel Prep Kit) 17.5-3.13-1.6 GM/177ML SOLN Take 1 kit by mouth once for 1 dose 354 mL 0    OneTouch Verio test strip       rosuvastatin (CRESTOR) 5 mg tablet TAKE 1 TABLET BY MOUTH EVERY DAY 90 tablet 3     No current facility-administered medications for this visit.

## 2025-05-20 ENCOUNTER — OFFICE VISIT (OUTPATIENT)
Dept: FAMILY MEDICINE CLINIC | Facility: CLINIC | Age: 50
End: 2025-05-20
Payer: COMMERCIAL

## 2025-05-20 VITALS
DIASTOLIC BLOOD PRESSURE: 82 MMHG | HEIGHT: 61 IN | TEMPERATURE: 98.1 F | OXYGEN SATURATION: 99 % | HEART RATE: 81 BPM | WEIGHT: 185 LBS | BODY MASS INDEX: 34.93 KG/M2 | SYSTOLIC BLOOD PRESSURE: 122 MMHG

## 2025-05-20 DIAGNOSIS — Z23 IMMUNIZATION DUE: ICD-10-CM

## 2025-05-20 DIAGNOSIS — Z79.4 TYPE 2 DIABETES MELLITUS WITHOUT COMPLICATION, WITH LONG-TERM CURRENT USE OF INSULIN (HCC): ICD-10-CM

## 2025-05-20 DIAGNOSIS — D47.1 MYELOPROLIFERATIVE DISORDER (HCC): ICD-10-CM

## 2025-05-20 DIAGNOSIS — E11.9 TYPE 2 DIABETES MELLITUS WITHOUT COMPLICATION, WITH LONG-TERM CURRENT USE OF INSULIN (HCC): ICD-10-CM

## 2025-05-20 DIAGNOSIS — Z00.00 ANNUAL PHYSICAL EXAM: Primary | ICD-10-CM

## 2025-05-20 PROCEDURE — 90750 HZV VACC RECOMBINANT IM: CPT

## 2025-05-20 PROCEDURE — 90471 IMMUNIZATION ADMIN: CPT

## 2025-05-20 PROCEDURE — 99396 PREV VISIT EST AGE 40-64: CPT | Performed by: FAMILY MEDICINE

## 2025-05-20 NOTE — ASSESSMENT & PLAN NOTE
Chronic, well controlled  Managed by [facility]  Obtain labs with their next draw  Lab Results   Component Value Date    HGBA1C 6.6 (H) 02/22/2025     Orders:  •  Lipid Panel with Direct LDL reflex; Future  •  Hemoglobin A1C; Future  •  Comprehensive metabolic panel; Future  •  CBC; Future  •  Albumin / creatinine urine ratio; Future  •  UA (URINE) with reflex to Scope; Future  •  TSH, 3rd generation with Free T4 reflex; Future

## 2025-05-20 NOTE — PROGRESS NOTES
Adult Annual Physical  Name: Kiesha Hannon      : 1975      MRN: [id number]  Encounter Provider: Venecia Hernandez MD  Encounter Date: 2025   Encounter department: [facility]  :  Assessment & Plan  Annual physical exam  UTD mammo. Colonoscopy due 2025 already scheduled   Encouraged to add regular exercise to her lifestyle as she currently does not exercise. 150min/week recommended.   Shingles due today, booster 6 months        Type 2 diabetes mellitus without complication, with long-term current use of insulin (HCC)  Chronic, well controlled  Managed by [facility]  Obtain labs with their next draw  Lab Results   Component Value Date    HGBA1C 6.6 (H) 2025     Orders:  •  Lipid Panel with Direct LDL reflex; Future  •  Hemoglobin A1C; Future  •  Comprehensive metabolic panel; Future  •  CBC; Future  •  Albumin / creatinine urine ratio; Future  •  UA (URINE) with reflex to Scope; Future  •  TSH, 3rd generation with Free T4 reflex; Future    Immunization due    Orders:  •  Zoster Vaccine Recombinant IM    Myeloproliferative disorder (HCC)  Previously monitored by hematology, now monitored with annual CBC by me  Return to heme if WBC >12k consistently           Preventive Screenings:  - Diabetes Screening: screening not indicated and has diabetes  - Cholesterol Screening: screening not indicated and has hyperlipidemia   - Hepatitis C screening: screening up-to-date   - Cervical cancer screening: screening up-to-date   - Breast cancer screening: screening up-to-date and orders placed   - Colon cancer screening: screening up-to-date   - Lung cancer screening: screening not indicated     Immunizations:  - Immunizations due: Zoster (Shingrix)    Counseling/Anticipatory Guidance:    - Dental health: discussed importance of regular tooth brushing, flossing, and dental visits.   - Diet: discussed recommendations for a healthy/well-balanced diet.   - Exercise: the  "importance of regular exercise/physical activity was discussed. Recommend exercise 3-5 times per week for at least 30 minutes.       Depression Screening and Follow-up Plan: Patient was screened for depression during today's encounter. They screened negative with a PHQ-2 score of 0.          History of Present Illness     Adult Annual Physical:  Patient presents for annual physical.     Diet and Physical Activity:  - Diet/Nutrition: no special diet.  - Exercise: no formal exercise.    Depression Screening:  - PHQ-2 Score: 0    General Health:  - Sleep: sleeps well.  - Hearing: normal hearing right ear and normal hearing left ear.  - Vision: no vision problems.  - Dental: regular dental visits.    /GYN Health:  - Follows with GYN: yes.   - Last menstrual cycle: 5/3/2025.   - History of STDs: no  - Contraception: tubal ligation.      Advanced Care Planning:  - Has an advanced directive?: yes    - Has a durable medical POA?: yes      Review of Systems   Constitutional:  Negative for chills and fever.   HENT:  Negative for congestion and sore throat.    Eyes:  Negative for pain and visual disturbance.   Respiratory:  Negative for cough and shortness of breath.    Cardiovascular:  Negative for chest pain and palpitations.   Gastrointestinal:  Negative for abdominal pain and nausea.   Genitourinary:  Negative for dysuria.   Musculoskeletal:  Negative for arthralgias and myalgias.   Skin:  Negative for rash and wound.   Neurological:  Negative for dizziness and headaches.   All other systems reviewed and are negative.    Medical History Reviewed by provider this encounter:  Tobacco  Allergies  Meds  Problems  Med Hx  Surg Hx  Fam Hx     .    Objective   /82   Pulse 81   Temp 98.1 °F (36.7 °C)   Ht 5' 1\" (1.549 m)   Wt 83.9 kg (185 lb)   LMP 05/03/2025   SpO2 99%   BMI 34.96 kg/m²     Physical Exam  Vitals and nursing note reviewed.   Constitutional:       General: She is not in acute distress.     " Appearance: She is well-developed. She is not ill-appearing.   HENT:      Head: Normocephalic and atraumatic.      Right Ear: Tympanic membrane, ear canal and external ear normal. No middle ear effusion.      Left Ear: Tympanic membrane, ear canal and external ear normal.  No middle ear effusion.      Nose: Nose normal. No congestion or rhinorrhea.      Mouth/Throat:      Lips: Pink.      Mouth: Mucous membranes are moist.      Pharynx: Oropharynx is clear. Uvula midline. No oropharyngeal exudate.      Tonsils: No tonsillar exudate.     Eyes:      General: Lids are normal.      Extraocular Movements: Extraocular movements intact.      Conjunctiva/sclera: Conjunctivae normal.      Pupils: Pupils are equal, round, and reactive to light.     Neck:      Thyroid: No thyromegaly.      Trachea: No tracheal deviation.     Cardiovascular:      Rate and Rhythm: Normal rate and regular rhythm.      Pulses: Normal pulses. no weak pulses.           Dorsalis pedis pulses are 2+ on the right side and 2+ on the left side.        Posterior tibial pulses are 2+ on the right side and 2+ on the left side.      Heart sounds: Normal heart sounds, S1 normal and S2 normal. No murmur heard.  Pulmonary:      Effort: Pulmonary effort is normal. No respiratory distress.      Breath sounds: Normal breath sounds. No decreased breath sounds, wheezing, rhonchi or rales.   Abdominal:      General: Bowel sounds are normal. There is no distension.      Palpations: Abdomen is soft.      Tenderness: There is no abdominal tenderness.     Musculoskeletal:      Right lower leg: No edema.      Left lower leg: No edema.   Feet:      Right foot:      Skin integrity: No ulcer, skin breakdown, erythema, warmth, callus or dry skin.      Left foot:      Skin integrity: No ulcer, skin breakdown, erythema, warmth, callus or dry skin.   Lymphadenopathy:      Cervical: No cervical adenopathy.     Skin:     General: Skin is warm and dry.      Capillary Refill:  Capillary refill takes less than 2 seconds.     Neurological:      Mental Status: She is alert and oriented to person, place, and time.      Deep Tendon Reflexes: Reflexes normal.      Reflex Scores:       Patellar reflexes are 2+ on the right side and 2+ on the left side.    Psychiatric:         Attention and Perception: Attention normal.         Mood and Affect: Mood normal.         Thought Content: Thought content does not include suicidal ideation.     Diabetic Foot Exam    Patient's shoes and socks removed.    Right Foot/Ankle   Right Foot Inspection  Skin Exam: skin normal and skin intact. No dry skin, no warmth, no callus, no erythema, no maceration, no abnormal color, no pre-ulcer, no ulcer and no callus.     Toe Exam: ROM and strength within normal limits.     Sensory   Vibration: intact  Monofilament testing: intact    Vascular  Capillary refills: < 3 seconds  The right DP pulse is 2+. The right PT pulse is 2+.     Left Foot/Ankle  Left Foot Inspection  Skin Exam: skin normal and skin intact. No dry skin, no warmth, no erythema, no maceration, normal color, no pre-ulcer, no ulcer and no callus.     Toe Exam: ROM and strength within normal limits.     Sensory   Vibration: intact  Monofilament testing: intact    Vascular  Capillary refills: < 3 seconds  The left DP pulse is 2+. The left PT pulse is 2+.     Assign Risk Category  No deformity present  No loss of protective sensation  No weak pulses  Risk: 0

## 2025-05-20 NOTE — ASSESSMENT & PLAN NOTE
Previously monitored by hematology, now monitored with annual CBC by me  Return to heme if WBC >12k consistently

## 2025-05-28 ENCOUNTER — PATIENT MESSAGE (OUTPATIENT)
Dept: FAMILY MEDICINE CLINIC | Facility: CLINIC | Age: 50
End: 2025-05-28

## 2025-05-30 ENCOUNTER — TELEPHONE (OUTPATIENT)
Dept: FAMILY MEDICINE CLINIC | Facility: CLINIC | Age: 50
End: 2025-05-30

## 2025-05-30 NOTE — TELEPHONE ENCOUNTER
Pt's daughter dropped off a wellness form for PCP to complete    Pt's last physical was on 5/20/2025 with PCP     Form given to clinical     Pleas call pt when form is complete so she may pick it up